# Patient Record
Sex: FEMALE | Race: WHITE | NOT HISPANIC OR LATINO | Employment: OTHER | URBAN - METROPOLITAN AREA
[De-identification: names, ages, dates, MRNs, and addresses within clinical notes are randomized per-mention and may not be internally consistent; named-entity substitution may affect disease eponyms.]

---

## 2017-05-16 ENCOUNTER — LAB (OUTPATIENT)
Dept: LAB | Facility: HOSPITAL | Age: 82
End: 2017-05-16

## 2017-05-16 DIAGNOSIS — D47.2 IGG MONOCLONAL GAMMOPATHY: ICD-10-CM

## 2017-05-16 DIAGNOSIS — Z86.2 H/O: IRON DEFICIENCY ANEMIA: ICD-10-CM

## 2017-05-16 LAB
ALBUMIN SERPL-MCNC: 3.7 G/DL (ref 3.5–5.2)
ALBUMIN/GLOB SERPL: 0.9 G/DL (ref 1.1–2.4)
ALP SERPL-CCNC: 66 U/L (ref 38–116)
ALT SERPL W P-5'-P-CCNC: 12 U/L (ref 0–33)
ANION GAP SERPL CALCULATED.3IONS-SCNC: 11.1 MMOL/L
AST SERPL-CCNC: 19 U/L (ref 0–32)
BASOPHILS # BLD AUTO: 0.01 10*3/MM3 (ref 0–0.1)
BASOPHILS NFR BLD AUTO: 0.2 % (ref 0–1.1)
BILIRUB SERPL-MCNC: 0.4 MG/DL (ref 0.1–1.2)
BUN BLD-MCNC: 19 MG/DL (ref 6–20)
BUN/CREAT SERPL: 26.4 (ref 7.3–30)
CALCIUM SPEC-SCNC: 9.2 MG/DL (ref 8.5–10.2)
CHLORIDE SERPL-SCNC: 100 MMOL/L (ref 98–107)
CO2 SERPL-SCNC: 23.9 MMOL/L (ref 22–29)
CREAT BLD-MCNC: 0.72 MG/DL (ref 0.6–1.1)
DEPRECATED RDW RBC AUTO: 45 FL (ref 37–49)
EOSINOPHIL # BLD AUTO: 0.07 10*3/MM3 (ref 0–0.36)
EOSINOPHIL NFR BLD AUTO: 1.3 % (ref 1–5)
ERYTHROCYTE [DISTWIDTH] IN BLOOD BY AUTOMATED COUNT: 12.4 % (ref 11.7–14.5)
FERRITIN SERPL-MCNC: 65.1 NG/ML
GFR SERPL CREATININE-BSD FRML MDRD: 78 ML/MIN/1.73
GLOBULIN UR ELPH-MCNC: 3.9 GM/DL (ref 1.8–3.5)
GLUCOSE BLD-MCNC: 107 MG/DL (ref 74–124)
HCT VFR BLD AUTO: 36 % (ref 34–45)
HGB BLD-MCNC: 12.1 G/DL (ref 11.5–14.9)
IMM GRANULOCYTES # BLD: 0.01 10*3/MM3 (ref 0–0.03)
IMM GRANULOCYTES NFR BLD: 0.2 % (ref 0–0.5)
IRON 24H UR-MRATE: 102 MCG/DL (ref 37–145)
IRON SATN MFR SERPL: 40 % (ref 14–48)
LYMPHOCYTES # BLD AUTO: 2.15 10*3/MM3 (ref 1–3.5)
LYMPHOCYTES NFR BLD AUTO: 40.2 % (ref 20–49)
MCH RBC QN AUTO: 33.1 PG (ref 27–33)
MCHC RBC AUTO-ENTMCNC: 33.6 G/DL (ref 32–35)
MCV RBC AUTO: 98.4 FL (ref 83–97)
MONOCYTES # BLD AUTO: 0.68 10*3/MM3 (ref 0.25–0.8)
MONOCYTES NFR BLD AUTO: 12.7 % (ref 4–12)
NEUTROPHILS # BLD AUTO: 2.43 10*3/MM3 (ref 1.5–7)
NEUTROPHILS NFR BLD AUTO: 45.4 % (ref 39–75)
NRBC BLD MANUAL-RTO: 0 /100 WBC (ref 0–0)
PLATELET # BLD AUTO: 138 10*3/MM3 (ref 150–375)
PMV BLD AUTO: 9.5 FL (ref 8.9–12.1)
POTASSIUM BLD-SCNC: 4.3 MMOL/L (ref 3.5–4.7)
PROT SERPL-MCNC: 7.6 G/DL (ref 6.3–8)
RBC # BLD AUTO: 3.66 10*6/MM3 (ref 3.9–5)
SODIUM BLD-SCNC: 135 MMOL/L (ref 134–145)
TIBC SERPL-MCNC: 255 MCG/DL (ref 249–505)
TRANSFERRIN SERPL-MCNC: 182 MG/DL (ref 200–360)
WBC NRBC COR # BLD: 5.35 10*3/MM3 (ref 4–10)

## 2017-05-16 PROCEDURE — 85025 COMPLETE CBC W/AUTO DIFF WBC: CPT

## 2017-05-16 PROCEDURE — 84466 ASSAY OF TRANSFERRIN: CPT

## 2017-05-16 PROCEDURE — 36415 COLL VENOUS BLD VENIPUNCTURE: CPT

## 2017-05-16 PROCEDURE — 80053 COMPREHEN METABOLIC PANEL: CPT

## 2017-05-16 PROCEDURE — 83540 ASSAY OF IRON: CPT

## 2017-05-16 PROCEDURE — 82728 ASSAY OF FERRITIN: CPT

## 2017-05-17 LAB
ALBUMIN SERPL-MCNC: 3.5 G/DL (ref 2.9–4.4)
ALBUMIN/GLOB SERPL: 1 {RATIO} (ref 0.7–1.7)
ALPHA1 GLOB FLD ELPH-MCNC: 0.2 G/DL (ref 0–0.4)
ALPHA2 GLOB SERPL ELPH-MCNC: 0.6 G/DL (ref 0.4–1)
B-GLOBULIN SERPL ELPH-MCNC: 0.7 G/DL (ref 0.7–1.3)
GAMMA GLOB SERPL ELPH-MCNC: 2.1 G/DL (ref 0.4–1.8)
GLOBULIN SER CALC-MCNC: 3.6 G/DL (ref 2.2–3.9)
IGA SERPL-MCNC: 66 MG/DL (ref 64–422)
IGG SERPL-MCNC: 2491 MG/DL (ref 700–1600)
IGM SERPL-MCNC: 19 MG/DL (ref 26–217)
INTERPRETATION SERPL IEP-IMP: ABNORMAL
KAPPA LC SERPL-MCNC: 35.58 MG/L (ref 3.3–19.4)
KAPPA LC/LAMBDA SER: 3.5 {RATIO} (ref 0.26–1.65)
LAMBDA LC FREE SERPL-MCNC: 10.18 MG/L (ref 5.71–26.3)
Lab: ABNORMAL
M-SPIKE: 1.9 G/DL
PROT SERPL-MCNC: 7.1 G/DL (ref 6–8.5)

## 2017-05-23 ENCOUNTER — OFFICE VISIT (OUTPATIENT)
Dept: ONCOLOGY | Facility: CLINIC | Age: 82
End: 2017-05-23

## 2017-05-23 ENCOUNTER — APPOINTMENT (OUTPATIENT)
Dept: LAB | Facility: HOSPITAL | Age: 82
End: 2017-05-23

## 2017-05-23 VITALS
RESPIRATION RATE: 16 BRPM | OXYGEN SATURATION: 96 % | HEART RATE: 65 BPM | BODY MASS INDEX: 26.65 KG/M2 | TEMPERATURE: 97.9 F | SYSTOLIC BLOOD PRESSURE: 130 MMHG | HEIGHT: 66 IN | DIASTOLIC BLOOD PRESSURE: 82 MMHG | WEIGHT: 165.8 LBS

## 2017-05-23 DIAGNOSIS — Z86.2 H/O: IRON DEFICIENCY ANEMIA: ICD-10-CM

## 2017-05-23 DIAGNOSIS — D47.2 IGG MONOCLONAL GAMMOPATHY: Primary | ICD-10-CM

## 2017-05-23 PROCEDURE — G0463 HOSPITAL OUTPT CLINIC VISIT: HCPCS | Performed by: INTERNAL MEDICINE

## 2017-05-23 PROCEDURE — 99213 OFFICE O/P EST LOW 20 MIN: CPT | Performed by: INTERNAL MEDICINE

## 2017-05-23 RX ORDER — APIXABAN 5 MG/1
TABLET, FILM COATED ORAL 2 TIMES DAILY
COMMUNITY
Start: 2017-05-03 | End: 2018-07-24

## 2018-04-04 ENCOUNTER — LAB (OUTPATIENT)
Dept: LAB | Facility: HOSPITAL | Age: 83
End: 2018-04-04

## 2018-04-04 DIAGNOSIS — Z86.2 H/O: IRON DEFICIENCY ANEMIA: ICD-10-CM

## 2018-04-04 DIAGNOSIS — D47.2 IGG MONOCLONAL GAMMOPATHY: ICD-10-CM

## 2018-04-04 LAB
ALBUMIN SERPL-MCNC: 3.8 G/DL (ref 3.5–5.2)
ALBUMIN/GLOB SERPL: 0.8 G/DL (ref 1.1–2.4)
ALP SERPL-CCNC: 64 U/L (ref 38–116)
ALT SERPL W P-5'-P-CCNC: 11 U/L (ref 0–33)
ANION GAP SERPL CALCULATED.3IONS-SCNC: 8.3 MMOL/L
AST SERPL-CCNC: 19 U/L (ref 0–32)
BASOPHILS # BLD AUTO: 0.01 10*3/MM3 (ref 0–0.1)
BASOPHILS NFR BLD AUTO: 0.2 % (ref 0–1.1)
BILIRUB SERPL-MCNC: 0.4 MG/DL (ref 0.1–1.2)
BUN BLD-MCNC: 27 MG/DL (ref 6–20)
BUN/CREAT SERPL: 38 (ref 7.3–30)
CALCIUM SPEC-SCNC: 10.1 MG/DL (ref 8.5–10.2)
CHLORIDE SERPL-SCNC: 100 MMOL/L (ref 98–107)
CO2 SERPL-SCNC: 26.7 MMOL/L (ref 22–29)
CREAT BLD-MCNC: 0.71 MG/DL (ref 0.6–1.1)
DEPRECATED RDW RBC AUTO: 45.6 FL (ref 37–49)
EOSINOPHIL # BLD AUTO: 0.06 10*3/MM3 (ref 0–0.36)
EOSINOPHIL NFR BLD AUTO: 1.1 % (ref 1–5)
ERYTHROCYTE [DISTWIDTH] IN BLOOD BY AUTOMATED COUNT: 12.5 % (ref 11.7–14.5)
GFR SERPL CREATININE-BSD FRML MDRD: 79 ML/MIN/1.73
GLOBULIN UR ELPH-MCNC: 4.5 GM/DL (ref 1.8–3.5)
GLUCOSE BLD-MCNC: 112 MG/DL (ref 74–124)
HCT VFR BLD AUTO: 37.4 % (ref 34–45)
HGB BLD-MCNC: 12.5 G/DL (ref 11.5–14.9)
IMM GRANULOCYTES # BLD: 0.02 10*3/MM3 (ref 0–0.03)
IMM GRANULOCYTES NFR BLD: 0.4 % (ref 0–0.5)
LYMPHOCYTES # BLD AUTO: 2.3 10*3/MM3 (ref 1–3.5)
LYMPHOCYTES NFR BLD AUTO: 41 % (ref 20–49)
MCH RBC QN AUTO: 33.4 PG (ref 27–33)
MCHC RBC AUTO-ENTMCNC: 33.4 G/DL (ref 32–35)
MCV RBC AUTO: 100 FL (ref 83–97)
MONOCYTES # BLD AUTO: 0.74 10*3/MM3 (ref 0.25–0.8)
MONOCYTES NFR BLD AUTO: 13.2 % (ref 4–12)
NEUTROPHILS # BLD AUTO: 2.48 10*3/MM3 (ref 1.5–7)
NEUTROPHILS NFR BLD AUTO: 44.1 % (ref 39–75)
NRBC BLD MANUAL-RTO: 0 /100 WBC (ref 0–0)
PLATELET # BLD AUTO: 167 10*3/MM3 (ref 150–375)
PMV BLD AUTO: 9.4 FL (ref 8.9–12.1)
POTASSIUM BLD-SCNC: 4.9 MMOL/L (ref 3.5–4.7)
PROT SERPL-MCNC: 8.3 G/DL (ref 6.3–8)
RBC # BLD AUTO: 3.74 10*6/MM3 (ref 3.9–5)
SODIUM BLD-SCNC: 135 MMOL/L (ref 134–145)
WBC NRBC COR # BLD: 5.61 10*3/MM3 (ref 4–10)

## 2018-04-04 PROCEDURE — 36415 COLL VENOUS BLD VENIPUNCTURE: CPT | Performed by: INTERNAL MEDICINE

## 2018-04-04 PROCEDURE — 85025 COMPLETE CBC W/AUTO DIFF WBC: CPT | Performed by: INTERNAL MEDICINE

## 2018-04-04 PROCEDURE — 80053 COMPREHEN METABOLIC PANEL: CPT

## 2018-04-05 LAB
ALBUMIN SERPL-MCNC: 3.8 G/DL (ref 2.9–4.4)
ALBUMIN/GLOB SERPL: 1 {RATIO} (ref 0.7–1.7)
ALPHA1 GLOB FLD ELPH-MCNC: 0.2 G/DL (ref 0–0.4)
ALPHA2 GLOB SERPL ELPH-MCNC: 0.7 G/DL (ref 0.4–1)
B-GLOBULIN SERPL ELPH-MCNC: 0.7 G/DL (ref 0.7–1.3)
GAMMA GLOB SERPL ELPH-MCNC: 2.5 G/DL (ref 0.4–1.8)
GLOBULIN SER CALC-MCNC: 4.1 G/DL (ref 2.2–3.9)
IGA SERPL-MCNC: 56 MG/DL (ref 64–422)
IGG SERPL-MCNC: 2875 MG/DL (ref 700–1600)
IGM SERPL-MCNC: 17 MG/DL (ref 26–217)
INTERPRETATION SERPL IEP-IMP: ABNORMAL
KAPPA LC SERPL-MCNC: 40.1 MG/L (ref 3.3–19.4)
KAPPA LC/LAMBDA SER: 4.51 {RATIO} (ref 0.26–1.65)
LAMBDA LC FREE SERPL-MCNC: 8.9 MG/L (ref 5.7–26.3)
Lab: ABNORMAL
M-SPIKE: 2.4 G/DL
PROT SERPL-MCNC: 7.9 G/DL (ref 6–8.5)

## 2018-04-11 ENCOUNTER — APPOINTMENT (OUTPATIENT)
Dept: LAB | Facility: HOSPITAL | Age: 83
End: 2018-04-11

## 2018-04-11 ENCOUNTER — OFFICE VISIT (OUTPATIENT)
Dept: ONCOLOGY | Facility: CLINIC | Age: 83
End: 2018-04-11

## 2018-04-11 VITALS
HEIGHT: 66 IN | WEIGHT: 168.6 LBS | TEMPERATURE: 97.7 F | OXYGEN SATURATION: 98 % | SYSTOLIC BLOOD PRESSURE: 104 MMHG | DIASTOLIC BLOOD PRESSURE: 82 MMHG | BODY MASS INDEX: 27.1 KG/M2 | HEART RATE: 66 BPM | RESPIRATION RATE: 18 BRPM

## 2018-04-11 DIAGNOSIS — D47.2 IGG MONOCLONAL GAMMOPATHY: Primary | ICD-10-CM

## 2018-04-11 PROCEDURE — G0463 HOSPITAL OUTPT CLINIC VISIT: HCPCS | Performed by: INTERNAL MEDICINE

## 2018-04-11 PROCEDURE — 99213 OFFICE O/P EST LOW 20 MIN: CPT | Performed by: INTERNAL MEDICINE

## 2018-04-11 RX ORDER — CELECOXIB 100 MG/1
100 CAPSULE ORAL 2 TIMES DAILY
COMMUNITY
End: 2019-10-16

## 2018-04-11 RX ORDER — ACETAMINOPHEN 325 MG/1
325 TABLET, FILM COATED ORAL 3 TIMES DAILY PRN
COMMUNITY
End: 2019-12-05

## 2018-04-11 RX ORDER — LEVOTHYROXINE SODIUM 0.15 MG/1
150 TABLET ORAL DAILY
COMMUNITY
Start: 2018-01-12 | End: 2019-08-26 | Stop reason: SDUPTHER

## 2018-04-11 RX ORDER — DILTIAZEM HYDROCHLORIDE 240 MG/1
CAPSULE, EXTENDED RELEASE ORAL
COMMUNITY
Start: 2018-04-10 | End: 2018-07-24

## 2018-04-11 NOTE — PROGRESS NOTES
Subjective     CHIEF COMPLAINT:      · Monoclonal gammopathy of IgG kappa      HISTORY OF PRESENT ILLNESS:     Isabell Angel is an 83 y.o.  patient with the above medical history.  She returns today for follow-up reporting no new symptoms.  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  No new areas of pain.  No recent infections.    Past Medical History:   Diagnosis Date   • Acid reflux    • Anemia     iron deficiency anemia, resolved   • Aortic aneurysm     MONITORED   • Arthritis    • Colon polyp    • Coronary artery disease    • Disease of thyroid gland    • History of iron deficiency anemia    • Hypercalcemia     Due to increased calcium intake in addition to calcium supplements   • Hypertension    • IgG monoclonal gammopathy     IgG kappa type   • Irregular heart beat     PACEMAKER       Past Surgical History:   Procedure Laterality Date   • ABDOMINAL SURGERY  09/18/2012    Pelvic abscess draining with appendectomy and resection and anastomosis of small intestine   • APPENDECTOMY  09/18/2012   • CHOLECYSTECTOMY  08/31/2012   • COLONOSCOPY N/A 4/13/2016    Procedure: COLONOSCOPY to cecum;  Surgeon: Pro Paredes MD;  Location: [facility] ENDOSCOPY;  Service:    • HYSTERECTOMY     • PACEMAKER IMPLANTATION         Cancer-related family history includes Cancer in her brother.    SCHEDULED MEDS:       Current Outpatient Prescriptions:   •  acetaminophen (TYLENOL) 325 MG tablet, Take 325 mg by mouth 3 (Three) Times a Day., Disp: , Rfl:   •  celecoxib (CeleBREX) 100 MG capsule, Take 100 mg by mouth Daily., Disp: , Rfl:   •  atorvastatin (LIPITOR) 10 MG tablet, Take 10 mg by mouth daily., Disp: , Rfl:   •  cetirizine (ZyrTEC) 10 MG tablet, Take 10 mg by mouth daily., Disp: , Rfl:   •  clobetasol (TEMOVATE) 0.05 % ointment, , Disp: , Rfl:   •  diltiaZEM (TIAZAC) 240 MG 24 hr capsule, , Disp: , Rfl:   •  ELIQUIS 5 MG tablet tablet, 2 (Two) Times a Day., Disp:  ", Rfl:   •  fluticasone (FLONASE) 50 MCG/ACT nasal spray, 2 sprays into each nostril. Administer 2 sprays in each nostril for each dose., Disp: , Rfl:   •  hydrocortisone butyrate 0.1 % cream, , Disp: , Rfl:   •  irbesartan (AVAPRO) 300 MG tablet, , Disp: , Rfl:   •  levothyroxine (SYNTHROID, LEVOTHROID) 150 MCG tablet, , Disp: , Rfl:   •  Metoprolol Succinate (TOPROL XL PO), Take 150 mg by mouth daily., Disp: , Rfl:   •  metoprolol succinate XL (TOPROL-XL) 100 MG 24 hr tablet, , Disp: , Rfl:   •  omeprazole (PriLOSEC) 20 MG capsule, Take 20 mg by mouth daily., Disp: , Rfl:   •  timolol (TIMOPTIC) 0.5 % ophthalmic solution, 1 drop., Disp: , Rfl:     REVIEW OF SYSTEMS:  GENERAL:  No weight changes.  No fatigue.    SKIN:  No skin rashes.  HEME/LYMPH: History of anemia.  RESPIRATORY:  No shortness breath.  CVS: No chest pain.  Lower extremity swelling due to amlodipine.  GI:  No abdominal pain.  MUSCULOSKELETAL:  Right shoulder pain.      Objective   VITAL SIGNS:     Vitals:    04/11/18 1426   BP: 104/82   Pulse: 66   Resp: 18   Temp: 97.7 °F (36.5 °C)   TempSrc: Oral   SpO2: 98%   Weight: 76.5 kg (168 lb 9.6 oz)   Height: 167.6 cm (65.98\")   PainSc: 0-No pain       Wt Readings from Last 3 Encounters:   04/11/18 76.5 kg (168 lb 9.6 oz)   07/07/17 72.6 kg (160 lb)   06/28/17 73 kg (161 lb)       PHYSICAL EXAMINATION:  GENERAL:  Patient appears in good general condition not in acute distress.  SKIN:  No skin rashes.  HEAD:  Normocephalic.  NECK:  Supple with no masses.  LYMPHATICS: No cervical or supraclavicular lymphadenopathy.   ABD: Soft and nontender.  No hepatosplenomegaly.  EXTREMITIES:   trace edema.      RESULT REVIEW:   Results for orders placed or performed in visit on 04/04/18   Comprehensive Metabolic Panel   Result Value Ref Range    Glucose 112 74 - 124 mg/dL    BUN 27 (H) 6 - 20 mg/dL    Creatinine 0.71 0.60 - 1.10 mg/dL    Sodium 135 134 - 145 mmol/L    Potassium 4.9 (H) 3.5 - 4.7 mmol/L    Chloride 100 " 98 - 107 mmol/L    CO2 26.7 22.0 - 29.0 mmol/L    Calcium 10.1 8.5 - 10.2 mg/dL    Total Protein 8.3 (H) 6.3 - 8.0 g/dL    Albumin 3.80 3.50 - 5.20 g/dL    ALT (SGPT) 11 0 - 33 U/L    AST (SGOT) 19 0 - 32 U/L    Alkaline Phosphatase 64 38 - 116 U/L    Total Bilirubin 0.4 0.1 - 1.2 mg/dL    eGFR Non African Amer 79 >60 mL/min/1.73    Globulin 4.5 (H) 1.8 - 3.5 gm/dL    A/G Ratio 0.8 (L) 1.1 - 2.4 g/dL    BUN/Creatinine Ratio 38.0 (H) 7.3 - 30.0    Anion Gap 8.3 mmol/L   JARED and PE,Serum   Result Value Ref Range    IgG 2875 (H) 700 - 1600 mg/dL    IgA 56 (L) 64 - 422 mg/dL    IgM 17 (L) 26 - 217 mg/dL    Total Protein 7.9 6.0 - 8.5 g/dL    Albumin 3.8 2.9 - 4.4 g/dL    Alpha-1-Globulin 0.2 0.0 - 0.4 g/dL    Alpha-2-Globulin 0.7 0.4 - 1.0 g/dL    Beta Globulin 0.7 0.7 - 1.3 g/dL    Gamma Globulin 2.5 (H) 0.4 - 1.8 g/dL    M-William 2.4 (H) Not Observed g/dL    Globulin 4.1 (H) 2.2 - 3.9 g/dL    A/G Ratio 1.0 0.7 - 1.7    Immunofixation Reflex, Serum Comment     Please note Comment     Free Light Chain, Kappa 40.1 (H) 3.3 - 19.4 mg/L    Free Lambda Light Chains 8.9 5.7 - 26.3 mg/L    Kappa/Lambda Ratio 4.51 (H) 0.26 - 1.65   CBC Auto Differential   Result Value Ref Range    WBC 5.61 4.00 - 10.00 10*3/mm3    RBC 3.74 (L) 3.90 - 5.00 10*6/mm3    Hemoglobin 12.5 11.5 - 14.9 g/dL    Hematocrit 37.4 34.0 - 45.0 %    .0 (H) 83.0 - 97.0 fL    MCH 33.4 (H) 27.0 - 33.0 pg    MCHC 33.4 32.0 - 35.0 g/dL    RDW 12.5 11.7 - 14.5 %    RDW-SD 45.6 37.0 - 49.0 fl    MPV 9.4 8.9 - 12.1 fL    Platelets 167 150 - 375 10*3/mm3    Neutrophil % 44.1 39.0 - 75.0 %    Lymphocyte % 41.0 20.0 - 49.0 %    Monocyte % 13.2 (H) 4.0 - 12.0 %    Eosinophil % 1.1 1.0 - 5.0 %    Basophil % 0.2 0.0 - 1.1 %    Immature Grans % 0.4 0.0 - 0.5 %    Neutrophils, Absolute 2.48 1.50 - 7.00 10*3/mm3    Lymphocytes, Absolute 2.30 1.00 - 3.50 10*3/mm3    Monocytes, Absolute 0.74 0.25 - 0.80 10*3/mm3    Eosinophils, Absolute 0.06 0.00 - 0.36 10*3/mm3     Basophils, Absolute 0.01 0.00 - 0.10 10*3/mm3    Immature Grans, Absolute 0.02 0.00 - 0.03 10*3/mm3    nRBC 0.0 0.0 - 0.0 /100 WBC       Assessment/Plan    Monoclonal gammopathy of undetermined significance of IgG kappa type.  M protein is 2.4.  It was 2.2 2016.  She is not anemic.  Kidney function is normal.  No changes to suggest transformation to multiple myeloma.  Her calcium level is at the upper end of normal.    PLAN:    1.  We discussed reducing calcium rich food items.       2.  Repeat CBC, CMP and paraprotein studies in 6 months.  We'll see in follow-up one week after the studies are done.          Hanna Santana MD  04/11/18

## 2018-07-24 ENCOUNTER — HOSPITAL ENCOUNTER (EMERGENCY)
Facility: HOSPITAL | Age: 83
Discharge: HOME | End: 2018-07-24
Attending: EMERGENCY MEDICINE | Admitting: EMERGENCY MEDICINE

## 2018-07-24 VITALS
TEMPERATURE: 97.7 F | HEART RATE: 68 BPM | WEIGHT: 160 LBS | DIASTOLIC BLOOD PRESSURE: 114 MMHG | OXYGEN SATURATION: 97 % | BODY MASS INDEX: 25.71 KG/M2 | RESPIRATION RATE: 18 BRPM | HEIGHT: 66 IN | SYSTOLIC BLOOD PRESSURE: 181 MMHG

## 2018-07-24 DIAGNOSIS — I10 UNCONTROLLED HYPERTENSION: Primary | ICD-10-CM

## 2018-07-24 LAB
ALBUMIN SERPL-MCNC: 4 G/DL (ref 3.5–5.2)
ALBUMIN/GLOB SERPL: 0.9 G/DL
ALP SERPL-CCNC: 60 U/L (ref 39–117)
ALT SERPL W P-5'-P-CCNC: 14 U/L (ref 1–33)
ANION GAP SERPL CALCULATED.3IONS-SCNC: 7 MMOL/L
AST SERPL-CCNC: 19 U/L (ref 1–32)
BASOPHILS # BLD AUTO: 0.01 10*3/MM3 (ref 0–0.2)
BASOPHILS NFR BLD AUTO: 0.2 % (ref 0–1.5)
BILIRUB SERPL-MCNC: 0.5 MG/DL (ref 0.1–1.2)
BUN BLD-MCNC: 22 MG/DL (ref 8–23)
BUN/CREAT SERPL: 31 (ref 7–25)
CALCIUM SPEC-SCNC: 10.4 MG/DL (ref 8.6–10.5)
CHLORIDE SERPL-SCNC: 96 MMOL/L (ref 98–107)
CO2 SERPL-SCNC: 28 MMOL/L (ref 22–29)
CREAT BLD-MCNC: 0.71 MG/DL (ref 0.57–1)
DEPRECATED RDW RBC AUTO: 43.5 FL (ref 37–54)
EOSINOPHIL # BLD AUTO: 0.04 10*3/MM3 (ref 0–0.7)
EOSINOPHIL NFR BLD AUTO: 0.8 % (ref 0.3–6.2)
ERYTHROCYTE [DISTWIDTH] IN BLOOD BY AUTOMATED COUNT: 12.1 % (ref 11.7–13)
GFR SERPL CREATININE-BSD FRML MDRD: 79 ML/MIN/1.73
GLOBULIN UR ELPH-MCNC: 4.4 GM/DL
GLUCOSE BLD-MCNC: 96 MG/DL (ref 65–99)
HCT VFR BLD AUTO: 38.3 % (ref 35.6–45.5)
HGB BLD-MCNC: 13 G/DL (ref 11.9–15.5)
HOLD SPECIMEN: NORMAL
HOLD SPECIMEN: NORMAL
IMM GRANULOCYTES # BLD: 0 10*3/MM3 (ref 0–0.03)
IMM GRANULOCYTES NFR BLD: 0 % (ref 0–0.5)
LYMPHOCYTES # BLD AUTO: 1.8 10*3/MM3 (ref 0.9–4.8)
LYMPHOCYTES NFR BLD AUTO: 37.3 % (ref 19.6–45.3)
MCH RBC QN AUTO: 33.1 PG (ref 26.9–32)
MCHC RBC AUTO-ENTMCNC: 33.9 G/DL (ref 32.4–36.3)
MCV RBC AUTO: 97.5 FL (ref 80.5–98.2)
MONOCYTES # BLD AUTO: 0.67 10*3/MM3 (ref 0.2–1.2)
MONOCYTES NFR BLD AUTO: 13.9 % (ref 5–12)
NEUTROPHILS # BLD AUTO: 2.31 10*3/MM3 (ref 1.9–8.1)
NEUTROPHILS NFR BLD AUTO: 47.8 % (ref 42.7–76)
PLATELET # BLD AUTO: 157 10*3/MM3 (ref 140–500)
PMV BLD AUTO: 9.9 FL (ref 6–12)
POTASSIUM BLD-SCNC: 4.6 MMOL/L (ref 3.5–5.2)
PROT SERPL-MCNC: 8.4 G/DL (ref 6–8.5)
RBC # BLD AUTO: 3.93 10*6/MM3 (ref 3.9–5.2)
SODIUM BLD-SCNC: 131 MMOL/L (ref 136–145)
WBC NRBC COR # BLD: 4.83 10*3/MM3 (ref 4.5–10.7)
WHOLE BLOOD HOLD SPECIMEN: NORMAL
WHOLE BLOOD HOLD SPECIMEN: NORMAL

## 2018-07-24 PROCEDURE — 80053 COMPREHEN METABOLIC PANEL: CPT | Performed by: NURSE PRACTITIONER

## 2018-07-24 PROCEDURE — 99284 EMERGENCY DEPT VISIT MOD MDM: CPT

## 2018-07-24 PROCEDURE — 85025 COMPLETE CBC W/AUTO DIFF WBC: CPT | Performed by: NURSE PRACTITIONER

## 2018-07-24 RX ORDER — DOXAZOSIN MESYLATE 1 MG/1
1 TABLET ORAL DAILY
COMMUNITY
End: 2019-09-23 | Stop reason: DRUGHIGH

## 2018-07-24 RX ORDER — HYDROCHLOROTHIAZIDE 12.5 MG/1
12.5 TABLET ORAL EVERY MORNING
COMMUNITY
End: 2019-08-14

## 2018-09-25 ENCOUNTER — APPOINTMENT (OUTPATIENT)
Dept: LAB | Facility: HOSPITAL | Age: 83
End: 2018-09-25

## 2018-10-02 ENCOUNTER — LAB (OUTPATIENT)
Dept: LAB | Facility: HOSPITAL | Age: 83
End: 2018-10-02

## 2018-10-02 DIAGNOSIS — D47.2 IGG MONOCLONAL GAMMOPATHY: ICD-10-CM

## 2018-10-02 LAB
ALBUMIN SERPL-MCNC: 4 G/DL (ref 3.5–5.2)
ALBUMIN/GLOB SERPL: 1 G/DL (ref 1.1–2.4)
ALP SERPL-CCNC: 61 U/L (ref 38–116)
ALT SERPL W P-5'-P-CCNC: 10 U/L (ref 0–33)
ANION GAP SERPL CALCULATED.3IONS-SCNC: 8.5 MMOL/L
AST SERPL-CCNC: 18 U/L (ref 0–32)
BASOPHILS # BLD AUTO: 0.01 10*3/MM3 (ref 0–0.1)
BASOPHILS NFR BLD AUTO: 0.2 % (ref 0–1.1)
BILIRUB SERPL-MCNC: 0.5 MG/DL (ref 0.1–1.2)
BUN BLD-MCNC: 23 MG/DL (ref 6–20)
BUN/CREAT SERPL: 30.7 (ref 7.3–30)
CALCIUM SPEC-SCNC: 9.7 MG/DL (ref 8.5–10.2)
CHLORIDE SERPL-SCNC: 92 MMOL/L (ref 98–107)
CO2 SERPL-SCNC: 28.5 MMOL/L (ref 22–29)
CREAT BLD-MCNC: 0.75 MG/DL (ref 0.6–1.1)
DEPRECATED RDW RBC AUTO: 44.4 FL (ref 37–49)
EOSINOPHIL # BLD AUTO: 0.05 10*3/MM3 (ref 0–0.36)
EOSINOPHIL NFR BLD AUTO: 0.9 % (ref 1–5)
ERYTHROCYTE [DISTWIDTH] IN BLOOD BY AUTOMATED COUNT: 12.3 % (ref 11.7–14.5)
GFR SERPL CREATININE-BSD FRML MDRD: 74 ML/MIN/1.73
GLOBULIN UR ELPH-MCNC: 3.9 GM/DL (ref 1.8–3.5)
GLUCOSE BLD-MCNC: 96 MG/DL (ref 74–124)
HCT VFR BLD AUTO: 33.7 % (ref 34–45)
HGB BLD-MCNC: 11.4 G/DL (ref 11.5–14.9)
IMM GRANULOCYTES # BLD: 0.01 10*3/MM3 (ref 0–0.03)
IMM GRANULOCYTES NFR BLD: 0.2 % (ref 0–0.5)
LYMPHOCYTES # BLD AUTO: 2.06 10*3/MM3 (ref 1–3.5)
LYMPHOCYTES NFR BLD AUTO: 36.7 % (ref 20–49)
MCH RBC QN AUTO: 33.1 PG (ref 27–33)
MCHC RBC AUTO-ENTMCNC: 33.8 G/DL (ref 32–35)
MCV RBC AUTO: 98 FL (ref 83–97)
MONOCYTES # BLD AUTO: 0.89 10*3/MM3 (ref 0.25–0.8)
MONOCYTES NFR BLD AUTO: 15.9 % (ref 4–12)
NEUTROPHILS # BLD AUTO: 2.59 10*3/MM3 (ref 1.5–7)
NEUTROPHILS NFR BLD AUTO: 46.1 % (ref 39–75)
NRBC BLD MANUAL-RTO: 0 /100 WBC (ref 0–0)
PLATELET # BLD AUTO: 142 10*3/MM3 (ref 150–375)
PMV BLD AUTO: 9.3 FL (ref 8.9–12.1)
POTASSIUM BLD-SCNC: 4.8 MMOL/L (ref 3.5–4.7)
PROT SERPL-MCNC: 7.9 G/DL (ref 6.3–8)
RBC # BLD AUTO: 3.44 10*6/MM3 (ref 3.9–5)
SODIUM BLD-SCNC: 129 MMOL/L (ref 134–145)
WBC NRBC COR # BLD: 5.61 10*3/MM3 (ref 4–10)

## 2018-10-02 PROCEDURE — 36415 COLL VENOUS BLD VENIPUNCTURE: CPT | Performed by: INTERNAL MEDICINE

## 2018-10-02 PROCEDURE — 80053 COMPREHEN METABOLIC PANEL: CPT | Performed by: INTERNAL MEDICINE

## 2018-10-02 PROCEDURE — 85025 COMPLETE CBC W/AUTO DIFF WBC: CPT | Performed by: INTERNAL MEDICINE

## 2018-10-03 LAB
ALBUMIN SERPL-MCNC: 3.9 G/DL (ref 2.9–4.4)
ALBUMIN/GLOB SERPL: 1 {RATIO} (ref 0.7–1.7)
ALPHA1 GLOB FLD ELPH-MCNC: 0.2 G/DL (ref 0–0.4)
ALPHA2 GLOB SERPL ELPH-MCNC: 0.6 G/DL (ref 0.4–1)
B-GLOBULIN SERPL ELPH-MCNC: 0.7 G/DL (ref 0.7–1.3)
GAMMA GLOB SERPL ELPH-MCNC: 2.6 G/DL (ref 0.4–1.8)
GLOBULIN SER CALC-MCNC: 4 G/DL (ref 2.2–3.9)
IGA SERPL-MCNC: 50 MG/DL (ref 64–422)
IGG SERPL-MCNC: 3151 MG/DL (ref 700–1600)
IGM SERPL-MCNC: 14 MG/DL (ref 26–217)
INTERPRETATION SERPL IEP-IMP: ABNORMAL
KAPPA LC SERPL-MCNC: 36.2 MG/L (ref 3.3–19.4)
KAPPA LC/LAMBDA SER: 4.11 {RATIO} (ref 0.26–1.65)
LAMBDA LC FREE SERPL-MCNC: 8.8 MG/L (ref 5.7–26.3)
Lab: ABNORMAL
M-SPIKE: 2.4 G/DL
PROT SERPL-MCNC: 7.9 G/DL (ref 6–8.5)

## 2018-10-09 ENCOUNTER — TELEPHONE (OUTPATIENT)
Dept: ONCOLOGY | Facility: HOSPITAL | Age: 83
End: 2018-10-09

## 2018-10-09 ENCOUNTER — APPOINTMENT (OUTPATIENT)
Dept: LAB | Facility: HOSPITAL | Age: 83
End: 2018-10-09

## 2018-10-09 ENCOUNTER — OFFICE VISIT (OUTPATIENT)
Dept: ONCOLOGY | Facility: CLINIC | Age: 83
End: 2018-10-09

## 2018-10-09 VITALS
DIASTOLIC BLOOD PRESSURE: 82 MMHG | HEIGHT: 66 IN | OXYGEN SATURATION: 96 % | TEMPERATURE: 97.6 F | WEIGHT: 165.2 LBS | RESPIRATION RATE: 16 BRPM | BODY MASS INDEX: 26.55 KG/M2 | HEART RATE: 85 BPM | SYSTOLIC BLOOD PRESSURE: 130 MMHG

## 2018-10-09 DIAGNOSIS — E87.1 HYPONATREMIA: ICD-10-CM

## 2018-10-09 DIAGNOSIS — Z86.2 H/O: IRON DEFICIENCY ANEMIA: ICD-10-CM

## 2018-10-09 DIAGNOSIS — D47.2 IGG MONOCLONAL GAMMOPATHY: Primary | ICD-10-CM

## 2018-10-09 LAB
ANION GAP SERPL CALCULATED.3IONS-SCNC: 7 MMOL/L
BASOPHILS # BLD AUTO: 0 10*3/MM3 (ref 0–0.1)
BASOPHILS NFR BLD AUTO: 0 % (ref 0–1.1)
BUN BLD-MCNC: 20 MG/DL (ref 6–20)
BUN/CREAT SERPL: 24.7 (ref 7.3–30)
CALCIUM SPEC-SCNC: 9.7 MG/DL (ref 8.5–10.2)
CHLORIDE SERPL-SCNC: 93 MMOL/L (ref 98–107)
CO2 SERPL-SCNC: 28 MMOL/L (ref 22–29)
CREAT BLD-MCNC: 0.81 MG/DL (ref 0.6–1.1)
DEPRECATED RDW RBC AUTO: 43.8 FL (ref 37–49)
EOSINOPHIL # BLD AUTO: 0.05 10*3/MM3 (ref 0–0.36)
EOSINOPHIL NFR BLD AUTO: 1.1 % (ref 1–5)
ERYTHROCYTE [DISTWIDTH] IN BLOOD BY AUTOMATED COUNT: 12.3 % (ref 11.7–14.5)
FERRITIN SERPL-MCNC: 79.5 NG/ML
GFR SERPL CREATININE-BSD FRML MDRD: 68 ML/MIN/1.73
GLUCOSE BLD-MCNC: 101 MG/DL (ref 74–124)
HCT VFR BLD AUTO: 33.8 % (ref 34–45)
HGB BLD-MCNC: 11.5 G/DL (ref 11.5–14.9)
IMM GRANULOCYTES # BLD: 0.01 10*3/MM3 (ref 0–0.03)
IMM GRANULOCYTES NFR BLD: 0.2 % (ref 0–0.5)
IRON 24H UR-MRATE: 68 MCG/DL (ref 37–145)
IRON SATN MFR SERPL: 23 % (ref 14–48)
LYMPHOCYTES # BLD AUTO: 1.67 10*3/MM3 (ref 1–3.5)
LYMPHOCYTES NFR BLD AUTO: 38 % (ref 20–49)
MCH RBC QN AUTO: 33.2 PG (ref 27–33)
MCHC RBC AUTO-ENTMCNC: 34 G/DL (ref 32–35)
MCV RBC AUTO: 97.7 FL (ref 83–97)
MONOCYTES # BLD AUTO: 0.66 10*3/MM3 (ref 0.25–0.8)
MONOCYTES NFR BLD AUTO: 15 % (ref 4–12)
NEUTROPHILS # BLD AUTO: 2.01 10*3/MM3 (ref 1.5–7)
NEUTROPHILS NFR BLD AUTO: 45.7 % (ref 39–75)
NRBC BLD MANUAL-RTO: 0 /100 WBC (ref 0–0)
PLATELET # BLD AUTO: 143 10*3/MM3 (ref 150–375)
PMV BLD AUTO: 9 FL (ref 8.9–12.1)
POTASSIUM BLD-SCNC: 5 MMOL/L (ref 3.5–4.7)
RBC # BLD AUTO: 3.46 10*6/MM3 (ref 3.9–5)
SODIUM BLD-SCNC: 128 MMOL/L (ref 134–145)
TIBC SERPL-MCNC: 291 MCG/DL (ref 249–505)
TRANSFERRIN SERPL-MCNC: 208 MG/DL (ref 200–360)
WBC NRBC COR # BLD: 4.4 10*3/MM3 (ref 4–10)

## 2018-10-09 PROCEDURE — 85025 COMPLETE CBC W/AUTO DIFF WBC: CPT | Performed by: INTERNAL MEDICINE

## 2018-10-09 PROCEDURE — 80048 BASIC METABOLIC PNL TOTAL CA: CPT | Performed by: INTERNAL MEDICINE

## 2018-10-09 PROCEDURE — 83540 ASSAY OF IRON: CPT | Performed by: INTERNAL MEDICINE

## 2018-10-09 PROCEDURE — 82728 ASSAY OF FERRITIN: CPT | Performed by: INTERNAL MEDICINE

## 2018-10-09 PROCEDURE — 84466 ASSAY OF TRANSFERRIN: CPT | Performed by: INTERNAL MEDICINE

## 2018-10-09 PROCEDURE — 99214 OFFICE O/P EST MOD 30 MIN: CPT | Performed by: INTERNAL MEDICINE

## 2018-10-09 PROCEDURE — 36415 COLL VENOUS BLD VENIPUNCTURE: CPT | Performed by: INTERNAL MEDICINE

## 2018-10-09 NOTE — TELEPHONE ENCOUNTER
Pt called , no answer, left message on voicemail and faxed  Lab results to Pt PCP Dr Meredith Kehrer

## 2018-10-09 NOTE — TELEPHONE ENCOUNTER
----- Message from Hanna Santana MD sent at 10/9/2018  4:22 PM EDT -----  Please inform the patient that sodium has decreased to 128.  Please forward the result to the patient's primary care physician.  Iron levels are not low.    Thank you

## 2018-10-09 NOTE — PROGRESS NOTES
Subjective     CHIEF COMPLAINT:      Chief Complaint   Patient presents with   • Follow-up     no concerns         HISTORY OF PRESENT ILLNESS:     Isabell Angel is a 83 y.o. female patient who returns today for follow up on monoclonal gammopathy.  She had a fall last week and landed her left side of the face and developed a bruise.  The bruise is gradually improving.  She reports that she tripped over a wire and was not dizzy before the fall.        Past Medical History:   Diagnosis Date   • Acid reflux    • Anemia     iron deficiency anemia, resolved   • Aortic aneurysm (CMS/HCC)     MONITORED   • Arthritis    • Atrial fibrillation (CMS/HCC)    • Colon polyp    • Coronary artery disease    • Disease of thyroid gland    • Glaucoma    • Glenohumeral arthritis, right    • History of iron deficiency anemia    • Hypercalcemia     Due to increased calcium intake in addition to calcium supplements   • Hypercholesterolemia    • Hypertension    • IgG monoclonal gammopathy     IgG kappa type   • Irregular heart beat     PACEMAKER       Past Surgical History:   Procedure Laterality Date   • ABDOMINAL SURGERY  09/18/2012    Pelvic abscess draining with appendectomy and resection and anastomosis of small intestine   • APPENDECTOMY  09/18/2012   • CHOLECYSTECTOMY  08/31/2012   • COLONOSCOPY N/A 4/13/2016    Procedure: COLONOSCOPY to cecum;  Surgeon: rPo Paredes MD;  Location: [facility] ENDOSCOPY;  Service:    • HYSTERECTOMY  1984   • PACEMAKER IMPLANTATION         Cancer-related family history includes Cancer in her brother.  Social History   Substance Use Topics   • Smoking status: Never Smoker   • Smokeless tobacco: Never Used   • Alcohol use No       MEDICATIONS:    Current Outpatient Prescriptions:   •  acetaminophen (TYLENOL) 325 MG tablet, Take 325 mg by mouth 3 (Three) Times a Day As Needed., Disp: , Rfl:   •  apixaban (ELIQUIS) 5 MG tablet tablet, Take 5 mg by mouth 2 (Two) Times a Day., Disp: , Rfl:   •   atorvastatin (LIPITOR) 10 MG tablet, Take 10 mg by mouth daily., Disp: , Rfl:   •  celecoxib (CeleBREX) 100 MG capsule, Take 100 mg by mouth 2 (Two) Times a Day. Takes 1/2 tablet twice a day, Disp: , Rfl:   •  cetirizine (ZyrTEC) 10 MG tablet, Take 10 mg by mouth Daily As Needed., Disp: , Rfl:   •  doxazosin (CARDURA) 1 MG tablet, Take 1 mg by mouth Daily., Disp: , Rfl:   •  fluticasone (FLONASE) 50 MCG/ACT nasal spray, 2 sprays into the nostril(s) as directed by provider Daily. Administer 2 sprays in each nostril for each dose. , Disp: , Rfl:   •  hydrochlorothiazide (HYDRODIURIL) 12.5 MG tablet, Take 12.5 mg by mouth Every Morning., Disp: , Rfl:   •  irbesartan (AVAPRO) 300 MG tablet, Take 300 mg by mouth Daily., Disp: , Rfl:   •  levothyroxine (SYNTHROID, LEVOTHROID) 150 MCG tablet, Take 150 mcg by mouth Daily., Disp: , Rfl:   •  metoprolol succinate XL (TOPROL-XL) 100 MG 24 hr tablet, Take 150 mg by mouth Daily., Disp: , Rfl:   •  omeprazole (PriLOSEC) 20 MG capsule, Take 20 mg by mouth 2 (Two) Times a Day., Disp: , Rfl:   •  timolol (TIMOPTIC) 0.5 % ophthalmic solution, Administer 1 drop to both eyes Daily., Disp: , Rfl:     ALLERGIES:  Allergies   Allergen Reactions   • Penicillin V Potassium Swelling     Throat swelled   • Latex Itching       REVIEW OF SYSTEMS:  Review of Systems   Constitutional: Negative for chills, fever and unexpected weight change.   HENT: Negative for mouth sores, nosebleeds, sore throat and voice change.    Eyes: Negative for visual disturbance.   Respiratory: Positive for cough. Negative for shortness of breath.    Cardiovascular: Negative for chest pain and leg swelling.   Gastrointestinal: Negative for abdominal pain, blood in stool, constipation, diarrhea, nausea and vomiting.   Genitourinary: Negative for dysuria, frequency and hematuria.   Musculoskeletal: Negative for arthralgias, back pain and joint swelling.   Skin: Negative for rash.   Neurological: Negative for dizziness,  "numbness and headaches.   Hematological: Negative for adenopathy. Does not bruise/bleed easily.   Psychiatric/Behavioral: Negative for dysphoric mood. The patient is not nervous/anxious.          Objective   VITAL SIGNS:     Vitals:    10/09/18 1434   BP: 130/82   Pulse: 85   Resp: 16   Temp: 97.6 °F (36.4 °C)   TempSrc: Oral   SpO2: 96%   Weight: 74.9 kg (165 lb 3.2 oz)   Height: 167.6 cm (65.98\")   PainSc: 0-No pain     Body mass index is 26.68 kg/m².     Wt Readings from Last 3 Encounters:   10/09/18 74.9 kg (165 lb 3.2 oz)   07/24/18 72.6 kg (160 lb)   04/11/18 76.5 kg (168 lb 9.6 oz)       PHYSICAL EXAMINATION:  GENERAL:  The patient appears in good general condition, not in acute distress.  SKIN: Warm and dry.  Ecchymosis over the left side of the face.  HEAD:  Normocephalic.  EYES:  No Jaundice. No Pallor. Pupils equal. EOMI.  NECK:  Supple with Good ROM. No Thyromegaly. No Masses.  LYMPHATICS:  No cervical or supraclavicular lymphadenopathy.  CHEST: Normal respiratory effort. Lungs clear to auscultation.   CARDIAC:  Normal S1 & S2. No murmurs.  ABDOMEN:  Soft. No tenderness. No Hepatomegaly. No Splenomegaly. No masses.  EXTREMITIES:  No arthritic changes. No Edema. No Calf tenderness.  NEUROLOGICAL:  No Focal neurological deficits.      DIAGNOSTIC DATA:     Component      Latest Ref Rng & Units 4/4/2018 7/24/2018 10/2/2018   WBC      4.00 - 10.00 10*3/mm3 5.61 4.83 5.61   RBC      3.90 - 5.00 10*6/mm3 3.74 (L) 3.93 3.44 (L)   Hemoglobin      11.5 - 14.9 g/dL 12.5 13.0 11.4 (L)   Hematocrit      34.0 - 45.0 % 37.4 38.3 33.7 (L)   MCV      83.0 - 97.0 fL 100.0 (H) 97.5 98.0 (H)   MCH      27.0 - 33.0 pg 33.4 (H) 33.1 (H) 33.1 (H)   MCHC      32.0 - 35.0 g/dL 33.4 33.9 33.8   RDW      11.7 - 14.5 % 12.5 12.1 12.3   RDW-SD      37.0 - 49.0 fl 45.6 43.5 44.4   MPV      8.9 - 12.1 fL 9.4 9.9 9.3   Platelets      150 - 375 10*3/mm3 167 157 142 (L)   Neutrophil %      39.0 - 75.0 % 44.1 47.8 46.1   Lymphocyte %      " 20.0 - 49.0 % 41.0 37.3 36.7   Monocyte %      4.0 - 12.0 % 13.2 (H) 13.9 (H) 15.9 (H)   Eosinophil %      1.0 - 5.0 % 1.1 0.8 0.9 (L)   Basophil %      0.0 - 1.1 % 0.2 0.2 0.2   Immature Grans %      0.0 - 0.5 % 0.4 0.0 0.2   Neutrophils, Absolute      1.50 - 7.00 10*3/mm3 2.48 2.31 2.59   Lymphocytes, Absolute      1.00 - 3.50 10*3/mm3 2.30 1.80 2.06   Monocytes, Absolute      0.25 - 0.80 10*3/mm3 0.74 0.67 0.89 (H)   Eosinophils, Absolute      0.00 - 0.36 10*3/mm3 0.06 0.04 0.05   Basophils, Absolute      0.00 - 0.10 10*3/mm3 0.01 0.01 0.01   Immature Grans, Absolute      0.00 - 0.03 10*3/mm3 0.02 0.00 0.01   nRBC      0.0 - 0.0 /100 WBC 0.0  0.0         Component      Latest Ref Rng & Units 11/30/2016 5/16/2017 4/4/2018 10/2/2018   IgG      700 - 1600 mg/dL 2715 (H) 2491 (H) 2875 (H) 3151 (H)   IgA      64 - 422 mg/dL 70 66 56 (L) 50 (L)   IgM      26 - 217 mg/dL 22 (L) 19 (L) 17 (L) 14 (L)   Total Protein      6.0 - 8.5 g/dL 7.6 7.1 7.9 7.9   Albumin      2.9 - 4.4 g/dL 3.8 3.5 3.8 3.9   Alpha-1-Globulin      0.0 - 0.4 g/dL 0.2 0.2 0.2 0.2   Alpha-2-Globulin      0.4 - 1.0 g/dL 0.6 0.6 0.7 0.6   Beta Globulin      0.7 - 1.3 g/dL 0.8 0.7 0.7 0.7   Gamma Globulin      0.4 - 1.8 g/dL 2.2 (H) 2.1 (H) 2.5 (H) 2.6 (H)   M-William      Not Observed g/dL 2.1 (H) 1.9 (H) 2.4 (H) 2.4 (H)   Globulin      2.2 - 3.9 g/dL 3.8 3.6 4.1 (H) 4.0 (H)   A/G Ratio      0.7 - 1.7 1.1 1.0 1.0 1.0   Immunofixation Reflex, Serum       Comment Comment Comment Comment   Please Note       Comment Comment Comment Comment   Free Light Chain, Kappa      3.3 - 19.4 mg/L 39.05 (H) 35.58 (H) 40.1 (H) 36.2 (H)   Free Lambda Light Chains      5.7 - 26.3 mg/L 9.87 10.18 8.9 8.8   Kappa/Lambda Ratio      0.26 - 1.65 3.96 (H) 3.50 (H) 4.51 (H) 4.11 (H)         Assessment/Plan   1.  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  M protein is stable at 2.4 g with no change since April 2018 and with a slight increase since 2017.   She is mildly anemic.  Kidney function remains normal.  There is no evidence of transformation to multiple myeloma.  I reassured the patient about the result.    2.  Anemia.  She has history of iron deficiency anemia.    3.  Hyponatremia.  This might be attributed to her diuretic or other antihypertensive medicines.  Patient states that she eats a low salt diet to her hypertension which was recently uncontrolled.      PLAN:    1.  Repeat CBC today.  I will also obtain ferritin and iron panel.    2.  Repeat BMP today.  We'll contact her with the result and we will forward it to the primary care physician.    3.  I'll see in follow-up in 6 months with CBC CMP and ferritin and iron panel SPEP and JARED one week before her return visit.        Hanna Santana MD  10/09/18

## 2019-03-19 ENCOUNTER — LAB (OUTPATIENT)
Dept: LAB | Facility: HOSPITAL | Age: 84
End: 2019-03-19

## 2019-03-19 DIAGNOSIS — Z86.2 H/O: IRON DEFICIENCY ANEMIA: ICD-10-CM

## 2019-03-19 DIAGNOSIS — E87.1 HYPONATREMIA: ICD-10-CM

## 2019-03-19 DIAGNOSIS — D47.2 IGG MONOCLONAL GAMMOPATHY: ICD-10-CM

## 2019-03-19 LAB
ALBUMIN SERPL-MCNC: 3.8 G/DL (ref 3.5–5.2)
ALBUMIN/GLOB SERPL: 0.8 G/DL (ref 1.1–2.4)
ALP SERPL-CCNC: 57 U/L (ref 38–116)
ALT SERPL W P-5'-P-CCNC: 9 U/L (ref 0–33)
ANION GAP SERPL CALCULATED.3IONS-SCNC: 9.6 MMOL/L
AST SERPL-CCNC: 17 U/L (ref 0–32)
BASOPHILS # BLD AUTO: 0.01 10*3/MM3 (ref 0–0.2)
BASOPHILS NFR BLD AUTO: 0.2 % (ref 0–1.5)
BILIRUB SERPL-MCNC: 0.5 MG/DL (ref 0.2–1.2)
BUN BLD-MCNC: 24 MG/DL (ref 6–20)
BUN/CREAT SERPL: 30 (ref 7.3–30)
CALCIUM SPEC-SCNC: 10 MG/DL (ref 8.5–10.2)
CHLORIDE SERPL-SCNC: 94 MMOL/L (ref 98–107)
CO2 SERPL-SCNC: 25.4 MMOL/L (ref 22–29)
CREAT BLD-MCNC: 0.8 MG/DL (ref 0.6–1.1)
DEPRECATED RDW RBC AUTO: 44 FL (ref 37–54)
EOSINOPHIL # BLD AUTO: 0.06 10*3/MM3 (ref 0–0.4)
EOSINOPHIL NFR BLD AUTO: 1 % (ref 0.3–6.2)
ERYTHROCYTE [DISTWIDTH] IN BLOOD BY AUTOMATED COUNT: 12.2 % (ref 12.3–15.4)
FERRITIN SERPL-MCNC: 100.3 NG/ML (ref 13–150)
GFR SERPL CREATININE-BSD FRML MDRD: 68 ML/MIN/1.73
GLOBULIN UR ELPH-MCNC: 4.6 GM/DL (ref 1.8–3.5)
GLUCOSE BLD-MCNC: 92 MG/DL (ref 74–124)
HCT VFR BLD AUTO: 34.7 % (ref 34–46.6)
HGB BLD-MCNC: 11.6 G/DL (ref 12–15.9)
IMM GRANULOCYTES # BLD AUTO: 0.01 10*3/MM3 (ref 0–0.05)
IMM GRANULOCYTES NFR BLD AUTO: 0.2 % (ref 0–0.5)
IRON 24H UR-MRATE: 84 MCG/DL (ref 37–145)
IRON SATN MFR SERPL: 33 % (ref 14–48)
LYMPHOCYTES # BLD AUTO: 2.19 10*3/MM3 (ref 0.7–3.1)
LYMPHOCYTES NFR BLD AUTO: 37.2 % (ref 19.6–45.3)
MCH RBC QN AUTO: 32.9 PG (ref 26.6–33)
MCHC RBC AUTO-ENTMCNC: 33.4 G/DL (ref 31.5–35.7)
MCV RBC AUTO: 98.3 FL (ref 79–97)
MONOCYTES # BLD AUTO: 0.72 10*3/MM3 (ref 0.1–0.9)
MONOCYTES NFR BLD AUTO: 12.2 % (ref 5–12)
NEUTROPHILS # BLD AUTO: 2.9 10*3/MM3 (ref 1.4–7)
NEUTROPHILS NFR BLD AUTO: 49.2 % (ref 42.7–76)
NRBC BLD AUTO-RTO: 0 /100 WBC (ref 0–0)
PLATELET # BLD AUTO: 121 10*3/MM3 (ref 140–450)
PMV BLD AUTO: 9.4 FL (ref 6–12)
POTASSIUM BLD-SCNC: 4.9 MMOL/L (ref 3.5–4.7)
PROT SERPL-MCNC: 8.4 G/DL (ref 6.3–8)
RBC # BLD AUTO: 3.53 10*6/MM3 (ref 3.77–5.28)
SODIUM BLD-SCNC: 129 MMOL/L (ref 134–145)
TIBC SERPL-MCNC: 252 MCG/DL (ref 249–505)
TRANSFERRIN SERPL-MCNC: 180 MG/DL (ref 200–360)
WBC NRBC COR # BLD: 5.89 10*3/MM3 (ref 3.4–10.8)

## 2019-03-19 PROCEDURE — 83540 ASSAY OF IRON: CPT

## 2019-03-19 PROCEDURE — 80053 COMPREHEN METABOLIC PANEL: CPT

## 2019-03-19 PROCEDURE — 85025 COMPLETE CBC W/AUTO DIFF WBC: CPT

## 2019-03-19 PROCEDURE — 36415 COLL VENOUS BLD VENIPUNCTURE: CPT

## 2019-03-19 PROCEDURE — 82728 ASSAY OF FERRITIN: CPT

## 2019-03-19 PROCEDURE — 84466 ASSAY OF TRANSFERRIN: CPT

## 2019-03-20 LAB
ALBUMIN SERPL-MCNC: 3.6 G/DL (ref 2.9–4.4)
ALBUMIN/GLOB SERPL: 0.9 {RATIO} (ref 0.7–1.7)
ALPHA1 GLOB FLD ELPH-MCNC: 0.2 G/DL (ref 0–0.4)
ALPHA2 GLOB SERPL ELPH-MCNC: 0.7 G/DL (ref 0.4–1)
B-GLOBULIN SERPL ELPH-MCNC: 0.7 G/DL (ref 0.7–1.3)
GAMMA GLOB SERPL ELPH-MCNC: 2.8 G/DL (ref 0.4–1.8)
GLOBULIN SER CALC-MCNC: 4.5 G/DL (ref 2.2–3.9)
IGA SERPL-MCNC: 39 MG/DL (ref 64–422)
IGG SERPL-MCNC: 3284 MG/DL (ref 700–1600)
IGM SERPL-MCNC: 14 MG/DL (ref 26–217)
INTERPRETATION SERPL IEP-IMP: ABNORMAL
KAPPA LC SERPL-MCNC: 43.3 MG/L (ref 3.3–19.4)
KAPPA LC/LAMBDA SER: 5.77 {RATIO} (ref 0.26–1.65)
LAMBDA LC FREE SERPL-MCNC: 7.5 MG/L (ref 5.7–26.3)
Lab: ABNORMAL
M-SPIKE: 2.7 G/DL
PROT SERPL-MCNC: 8.1 G/DL (ref 6–8.5)

## 2019-03-26 ENCOUNTER — OFFICE VISIT (OUTPATIENT)
Dept: ONCOLOGY | Facility: CLINIC | Age: 84
End: 2019-03-26

## 2019-03-26 ENCOUNTER — APPOINTMENT (OUTPATIENT)
Dept: LAB | Facility: HOSPITAL | Age: 84
End: 2019-03-26

## 2019-03-26 VITALS
OXYGEN SATURATION: 100 % | BODY MASS INDEX: 26.1 KG/M2 | TEMPERATURE: 97.5 F | DIASTOLIC BLOOD PRESSURE: 90 MMHG | RESPIRATION RATE: 16 BRPM | HEART RATE: 84 BPM | WEIGHT: 162.4 LBS | HEIGHT: 66 IN | SYSTOLIC BLOOD PRESSURE: 140 MMHG

## 2019-03-26 DIAGNOSIS — E53.8 B12 DEFICIENCY: ICD-10-CM

## 2019-03-26 DIAGNOSIS — D69.6 THROMBOCYTOPENIA (HCC): ICD-10-CM

## 2019-03-26 DIAGNOSIS — D47.2 IGG MONOCLONAL GAMMOPATHY: Primary | ICD-10-CM

## 2019-03-26 DIAGNOSIS — Z86.2 H/O: IRON DEFICIENCY ANEMIA: ICD-10-CM

## 2019-03-26 PROCEDURE — G0463 HOSPITAL OUTPT CLINIC VISIT: HCPCS | Performed by: INTERNAL MEDICINE

## 2019-03-26 PROCEDURE — 99214 OFFICE O/P EST MOD 30 MIN: CPT | Performed by: INTERNAL MEDICINE

## 2019-03-26 RX ORDER — FAMOTIDINE 40 MG/1
TABLET, FILM COATED ORAL
COMMUNITY
Start: 2019-03-15 | End: 2019-11-30 | Stop reason: SDUPTHER

## 2019-03-26 RX ORDER — LANOLIN ALCOHOL/MO/W.PET/CERES
1000 CREAM (GRAM) TOPICAL DAILY
Start: 2019-03-26

## 2019-03-26 RX ORDER — TIMOLOL MALEATE 5 MG/ML
SOLUTION OPHTHALMIC
COMMUNITY
Start: 2019-01-24 | End: 2019-12-05

## 2019-03-26 NOTE — PROGRESS NOTES
Subjective     CHIEF COMPLAINT:      Chief Complaint   Patient presents with   • Follow-up     no concern       HISTORY OF PRESENT ILLNESS:     Isabell Angel is a 84 y.o. female patient who returns today for follow up on her monoclonal gammopathy.  She continues that this is related to her arthritis affecting her hands and bilateral shoulder joints.  She is not complaining of back pain.  Recent labs done by her PCP, she was found to have a low normal vitamin B12 level.  She was given a B12 injection by her PCP.    Patient has hyponatremia attributed to her diuretics.  She saw her PCP but no changes were made to her medicines.        REVIEW OF SYSTEMS:  Review of Systems   Constitutional: Negative for chills, fever and unexpected weight change.   HENT: Negative for mouth sores, nosebleeds, sore throat and voice change.    Eyes: Negative for visual disturbance.   Respiratory: Negative for cough and shortness of breath.    Cardiovascular: Negative for chest pain and leg swelling.   Gastrointestinal: Negative for abdominal pain, blood in stool, constipation, diarrhea, nausea and vomiting.   Genitourinary: Negative for dysuria, frequency and hematuria.   Musculoskeletal: Negative for arthralgias, back pain and joint swelling.   Skin: Negative for rash.   Neurological: Negative for dizziness, numbness and headaches.   Hematological: Negative for adenopathy. Does not bruise/bleed easily.   Psychiatric/Behavioral: Negative for dysphoric mood. The patient is not nervous/anxious.      I verified the ROS obtained by the MA.      Past Medical History:   Diagnosis Date   • Acid reflux    • Anemia     iron deficiency anemia, resolved   • Aortic aneurysm (CMS/HCC)     MONITORED   • Arthritis    • Atrial fibrillation (CMS/HCC)    • Colon polyp    • Coronary artery disease    • Disease of thyroid gland    • Glaucoma    • Glenohumeral arthritis, right    • History of iron deficiency anemia    • Hypercalcemia     Due to increased  calcium intake in addition to calcium supplements   • Hypercholesterolemia    • Hypertension    • IgG monoclonal gammopathy     IgG kappa type   • Irregular heart beat     PACEMAKER       Past Surgical History:   Procedure Laterality Date   • ABDOMINAL SURGERY  09/18/2012    Pelvic abscess draining with appendectomy and resection and anastomosis of small intestine   • APPENDECTOMY  09/18/2012   • CHOLECYSTECTOMY  08/31/2012   • COLONOSCOPY N/A 4/13/2016    Procedure: COLONOSCOPY to cecum;  Surgeon: Pro Paredes MD;  Location: [facility] ENDOSCOPY;  Service:    • HYSTERECTOMY  1984   • PACEMAKER IMPLANTATION         Cancer-related family history includes Cancer in her brother.  Social History     Tobacco Use   • Smoking status: Never Smoker   • Smokeless tobacco: Never Used   Substance Use Topics   • Alcohol use: No       MEDICATIONS:    Current Outpatient Medications:   •  acetaminophen (TYLENOL) 325 MG tablet, Take 325 mg by mouth 3 (Three) Times a Day As Needed., Disp: , Rfl:   •  apixaban (ELIQUIS) 5 MG tablet tablet, Take 5 mg by mouth 2 (Two) Times a Day., Disp: , Rfl:   •  atorvastatin (LIPITOR) 10 MG tablet, Take 10 mg by mouth daily., Disp: , Rfl:   •  celecoxib (CeleBREX) 100 MG capsule, Take 100 mg by mouth 2 (Two) Times a Day. Takes 1/2 tablet twice a day, Disp: , Rfl:   •  Cetirizine HCl (ZYRTEC PO), Take  by mouth., Disp: , Rfl:   •  doxazosin (CARDURA) 1 MG tablet, Take 1 mg by mouth Daily., Disp: , Rfl:   •  famotidine (PEPCID) 40 MG tablet, , Disp: , Rfl:   •  fluticasone (FLONASE) 50 MCG/ACT nasal spray, 2 sprays into the nostril(s) as directed by provider Daily. Administer 2 sprays in each nostril for each dose. , Disp: , Rfl:   •  hydrochlorothiazide (HYDRODIURIL) 12.5 MG tablet, Take 12.5 mg by mouth Every Morning., Disp: , Rfl:   •  irbesartan (AVAPRO) 300 MG tablet, Take 300 mg by mouth Daily., Disp: , Rfl:   •  levothyroxine (SYNTHROID, LEVOTHROID) 150 MCG tablet, Take 150 mcg by mouth  "Daily., Disp: , Rfl:   •  metoprolol succinate XL (TOPROL-XL) 100 MG 24 hr tablet, Take 150 mg by mouth Daily., Disp: , Rfl:   •  timolol (TIMOPTIC) 0.5 % ophthalmic solution, Administer 1 drop to both eyes Daily., Disp: , Rfl:   •  timolol (TIMOPTIC-XR) 0.5 % ophthalmic gel-forming, , Disp: , Rfl:   •  omeprazole (PriLOSEC) 20 MG capsule, Take 20 mg by mouth 2 (Two) Times a Day., Disp: , Rfl:     ALLERGIES:  Allergies   Allergen Reactions   • Penicillin V Potassium Swelling     Throat swelled   • Latex Itching         Objective   VITAL SIGNS:     Vitals:    03/26/19 1243   BP: 140/90   Pulse: 84   Resp: 16   Temp: 97.5 °F (36.4 °C)   TempSrc: Oral   SpO2: 100%   Weight: 73.7 kg (162 lb 6.4 oz)   Height: 167 cm (65.75\")   PainSc: 0-No pain     Body mass index is 26.41 kg/m².     Wt Readings from Last 3 Encounters:   03/26/19 73.7 kg (162 lb 6.4 oz)   10/09/18 74.9 kg (165 lb 3.2 oz)   07/24/18 72.6 kg (160 lb)       PHYSICAL EXAMINATION:  GENERAL:  The patient appears in good general condition, not in acute distress.  SKIN: Warm and dry. No skin rashes, ecchymosis or petechiae.  HEAD:  Normocephalic.  EYES:  No Jaundice. No Pallor.  NECK:  Supple with Good ROM. No Thyromegaly. No Masses.  LYMPHATICS:  No cervical or supraclavicular lymphadenopathy.  CHEST: Normal respiratory effort. Lungs clear to auscultation.   CARDIAC:  Normal S1 & S2. No murmurs. No edema.  ABDOMEN:  Soft. No tenderness. No Hepatomegaly. No Splenomegaly. No masses.  EXTREMITIES: Osteoarthritic changes in the fingers bilaterally. No Calf tenderness.  NEUROLOGICAL:  No Focal neurological deficits.         DIAGNOSTIC DATA:     Results from last 7 days   Lab Units 03/19/19  1327   WBC 10*3/mm3 5.89   NEUTROS ABS 10*3/mm3 2.90   HEMOGLOBIN g/dL 11.6*   HEMATOCRIT % 34.7   PLATELETS 10*3/mm3 121*     Results from last 7 days   Lab Units 03/19/19  1327   SODIUM mmol/L 129*   POTASSIUM mmol/L 4.9*   CHLORIDE mmol/L 94*   CO2 mmol/L 25.4   BUN mg/dL 24* "   CREATININE mg/dL 0.80   CALCIUM mg/dL 10.0   ALBUMIN g/dL 3.80  3.6   BILIRUBIN mg/dL 0.5   ALK PHOS U/L 57   ALT (SGPT) U/L 9   AST (SGOT) U/L 17   GLUCOSE mg/dL 92   FERRITIN ng/mL 100.30   IRON mcg/dL 84   TIBC mcg/dL 252         Component      Latest Ref Rng & Units 5/16/2017 4/4/2018 10/2/2018 3/19/2019   IgG      700 - 1600 mg/dL 2491 (H) 2875 (H) 3151 (H) 3284 (H)   IgA      64 - 422 mg/dL 66 56 (L) 50 (L) 39 (L)   IgM      26 - 217 mg/dL 19 (L) 17 (L) 14 (L) 14 (L)   Total Protein      6.0 - 8.5 g/dL 7.1 7.9 7.9 8.1   Albumin      2.9 - 4.4 g/dL 3.5 3.8 3.9 3.6   Alpha-1-Globulin      0.0 - 0.4 g/dL 0.2 0.2 0.2 0.2   Alpha-2-Globulin      0.4 - 1.0 g/dL 0.6 0.7 0.6 0.7   Beta Globulin      0.7 - 1.3 g/dL 0.7 0.7 0.7 0.7   Gamma Globulin      0.4 - 1.8 g/dL 2.1 (H) 2.5 (H) 2.6 (H) 2.8 (H)   M-William      Not Observed g/dL 1.9 (H) 2.4 (H) 2.4 (H) 2.7 (H)   Globulin      2.2 - 3.9 g/dL 3.6 4.1 (H) 4.0 (H) 4.5 (H)   A/G Ratio      0.7 - 1.7 1.0 1.0 1.0 0.9   Immunofixation Reflex, Serum       Comment Comment Comment Comment   Please Note       Comment Comment Comment Comment   Kappa FLC      3.3 - 19.4 mg/L 35.58 (H) 40.1 (H) 36.2 (H) 43.3 (H)   Lambda FLC      5.7 - 26.3 mg/L 10.18 8.9 8.8 7.5   Kappa/Lambda Ratio      0.26 - 1.65 3.50 (H) 4.51 (H) 4.11 (H) 5.77 (H)         Assessment/Plan   1.  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  M protein increased to 2.7.  The flow stable at 2.4 and 2018.  Kidney function remains normal.  Calcium is at the upper end of normal.  She is mildly anemic but hemoglobin has overall improved.  There are no signs to suggest transformation to multiple myeloma but I recommended closer monitoring of her labs due to the increase in the M protein.    2.  Anemia.  She has history of iron deficiency anemia.  Iron stores are normal.  She was found to have B12 deficiency of the labs done by her rheumatologist.    3.  Hyponatremia.  This is likely due to  hydrochlorothiazide.  Sodium is relatively stable.      PLAN:    1.  Start B12 1000 mcg daily.    2.  I recommended repeating SPEP, JARED, CBC CMP beta-2 microglobulin B12 levels in August 2019.  We will see her in follow-up 1 week after to review the results.        Hanna Santana MD  03/26/19

## 2019-08-13 ENCOUNTER — APPOINTMENT (OUTPATIENT)
Dept: LAB | Facility: HOSPITAL | Age: 84
End: 2019-08-13

## 2019-08-14 ENCOUNTER — OFFICE VISIT (OUTPATIENT)
Dept: FAMILY MEDICINE CLINIC | Facility: CLINIC | Age: 84
End: 2019-08-14

## 2019-08-14 VITALS
TEMPERATURE: 97.5 F | OXYGEN SATURATION: 97 % | HEART RATE: 77 BPM | DIASTOLIC BLOOD PRESSURE: 96 MMHG | SYSTOLIC BLOOD PRESSURE: 130 MMHG

## 2019-08-14 DIAGNOSIS — I10 ESSENTIAL HYPERTENSION: ICD-10-CM

## 2019-08-14 DIAGNOSIS — J90 PLEURAL EFFUSION: Primary | ICD-10-CM

## 2019-08-14 DIAGNOSIS — I48.19 PERSISTENT ATRIAL FIBRILLATION (HCC): ICD-10-CM

## 2019-08-14 PROBLEM — E03.9 ACQUIRED HYPOTHYROIDISM: Status: ACTIVE | Noted: 2019-08-14

## 2019-08-14 PROBLEM — Z95.0 NORMALLY FUNCTIONING CARDIAC PACEMAKER PRESENT: Status: ACTIVE | Noted: 2019-08-14

## 2019-08-14 PROBLEM — I48.91 ATRIAL FIBRILLATION (HCC): Status: ACTIVE | Noted: 2019-08-14

## 2019-08-14 PROBLEM — I07.1 TRICUSPID REGURGITATION: Status: ACTIVE | Noted: 2019-08-14

## 2019-08-14 LAB
BUN SERPL-MCNC: 27 MG/DL (ref 8–23)
BUN/CREAT SERPL: 24.5 (ref 7–25)
CALCIUM SERPL-MCNC: 9.8 MG/DL (ref 8.6–10.5)
CHLORIDE SERPL-SCNC: 102 MMOL/L (ref 98–107)
CO2 SERPL-SCNC: 28.3 MMOL/L (ref 22–29)
CREAT SERPL-MCNC: 1.1 MG/DL (ref 0.57–1)
GLUCOSE SERPL-MCNC: 94 MG/DL (ref 65–99)
POTASSIUM SERPL-SCNC: 4.3 MMOL/L (ref 3.5–5.2)
SODIUM SERPL-SCNC: 141 MMOL/L (ref 136–145)

## 2019-08-14 PROCEDURE — 99214 OFFICE O/P EST MOD 30 MIN: CPT | Performed by: FAMILY MEDICINE

## 2019-08-14 RX ORDER — BUMETANIDE 1 MG/1
0.5 TABLET ORAL DAILY
COMMUNITY

## 2019-08-14 NOTE — PROGRESS NOTES
Subjective   Isabell Angel is a 84 y.o. female.     Chief Complaint   Patient presents with   • Follow-up     Was in the hospital for dyspnea, and PNA. pt doesn't know what medication she is onj        Patient presents for follow-up effusion.  I saw her at my previous office for follow-up of a hospitalization.  She is feeling much better.  She has already seen pulmonary and she has a PFT scheduled.  She denies any return of shortness of breath.  She did go to the ER once since I last saw her and they told her to stop her hydrochlorothiazide because she was dehydrated.  I do not have those records available for review.           The following portions of the patient's history were reviewed and updated as appropriate: allergies, current medications, past family history, past medical history, past social history, past surgical history and problem list.    Past Medical History:   Diagnosis Date   • Acid reflux    • Anemia     iron deficiency anemia, resolved   • Aortic aneurysm (CMS/HCC)     MONITORED   • Arthritis    • Atrial fibrillation (CMS/HCC)    • Colon polyp    • Coronary artery disease    • Disease of thyroid gland    • Glaucoma    • Glenohumeral arthritis, right    • History of iron deficiency anemia    • Hypercalcemia     Due to increased calcium intake in addition to calcium supplements   • Hypercholesterolemia    • Hypertension    • IgG monoclonal gammopathy     IgG kappa type   • Irregular heart beat     PACEMAKER       Past Surgical History:   Procedure Laterality Date   • ABDOMINAL SURGERY  09/18/2012    Pelvic abscess draining with appendectomy and resection and anastomosis of small intestine   • APPENDECTOMY  09/18/2012   • CHOLECYSTECTOMY  08/31/2012   • COLONOSCOPY N/A 4/13/2016    Procedure: COLONOSCOPY to cecum;  Surgeon: Pro Paredes MD;  Location: [facility] ENDOSCOPY;  Service:    • HYSTERECTOMY  1984   • PACEMAKER IMPLANTATION         Family History   Problem Relation Age of Onset    • Cancer Brother    • Hypertension Mother    • Heart disease Father    • Hypertension Sister        Social History     Socioeconomic History   • Marital status:      Spouse name: Not on file   • Number of children: Not on file   • Years of education: Not on file   • Highest education level: Not on file   Occupational History     Employer: RETIRED   Tobacco Use   • Smoking status: Never Smoker   • Smokeless tobacco: Never Used   Substance and Sexual Activity   • Alcohol use: No   • Drug use: No   • Sexual activity: Defer   Social History Narrative    The patient is a . She is a retired . She does not smoke or drink.       Review of Systems   Constitutional: Negative for chills, fatigue and fever.   HENT: Negative for congestion, rhinorrhea and sore throat.    Respiratory: Negative for cough and shortness of breath.    Cardiovascular: Negative for chest pain and leg swelling.   Gastrointestinal: Negative for abdominal pain.   Endocrine: Negative for polydipsia and polyuria.   Genitourinary: Negative for dysuria.   Musculoskeletal: Negative for arthralgias and myalgias.   Skin: Negative for rash.   Neurological: Negative for dizziness.   Hematological: Does not bruise/bleed easily.   Psychiatric/Behavioral: Negative for sleep disturbance.       Objective   Vitals:    08/14/19 1040   BP: 130/96   Pulse: 77   Temp: 97.5 °F (36.4 °C)   SpO2: 97%     There is no height or weight on file to calculate BMI.  Physical Exam   Constitutional: She is oriented to person, place, and time. She appears well-developed and well-nourished.   HENT:   Head: Normocephalic and atraumatic.   Eyes: Conjunctivae and EOM are normal. Pupils are equal, round, and reactive to light.   Neck: Neck supple.   Cardiovascular: Normal rate and regular rhythm.   Murmur heard.  Pacer present   Pulmonary/Chest: Effort normal. No tachypnea. No respiratory distress. She has decreased breath sounds in the left lower field.    Musculoskeletal: She exhibits no edema.   Neurological: She is alert and oriented to person, place, and time.   Skin: Skin is warm and dry. She is not diaphoretic.   Psychiatric: She has a normal mood and affect.         Assessment/Plan   Isabell was seen today for follow-up.    Diagnoses and all orders for this visit:    Pleural effusion    Essential hypertension    Persistent atrial fibrillation (CMS/HCC)               Patient Instructions   Follow up pending lab results and ER record review.

## 2019-08-15 ENCOUNTER — LAB (OUTPATIENT)
Dept: LAB | Facility: HOSPITAL | Age: 84
End: 2019-08-15

## 2019-08-15 DIAGNOSIS — D47.2 IGG MONOCLONAL GAMMOPATHY: ICD-10-CM

## 2019-08-15 DIAGNOSIS — E53.8 B12 DEFICIENCY: ICD-10-CM

## 2019-08-15 DIAGNOSIS — D69.6 THROMBOCYTOPENIA (HCC): ICD-10-CM

## 2019-08-15 DIAGNOSIS — Z86.2 H/O: IRON DEFICIENCY ANEMIA: ICD-10-CM

## 2019-08-15 LAB
ALBUMIN SERPL-MCNC: 3.7 G/DL (ref 3.5–5.2)
ALBUMIN/GLOB SERPL: 0.9 G/DL (ref 1.1–2.4)
ALP SERPL-CCNC: 51 U/L (ref 38–116)
ALT SERPL W P-5'-P-CCNC: 11 U/L (ref 0–33)
ANION GAP SERPL CALCULATED.3IONS-SCNC: 10.5 MMOL/L (ref 5–15)
AST SERPL-CCNC: 19 U/L (ref 0–32)
B2 MICROGLOB SERPL-MCNC: 11.6 MG/L (ref 0.8–2.2)
BASOPHILS # BLD AUTO: 0.01 10*3/MM3 (ref 0–0.2)
BASOPHILS NFR BLD AUTO: 0.2 % (ref 0–1.5)
BILIRUB SERPL-MCNC: 0.5 MG/DL (ref 0.2–1.2)
BUN BLD-MCNC: 26 MG/DL (ref 6–20)
BUN/CREAT SERPL: 22 (ref 7.3–30)
CALCIUM SPEC-SCNC: 9.7 MG/DL (ref 8.5–10.2)
CHLORIDE SERPL-SCNC: 101 MMOL/L (ref 98–107)
CO2 SERPL-SCNC: 26.5 MMOL/L (ref 22–29)
CREAT BLD-MCNC: 1.18 MG/DL (ref 0.6–1.1)
DEPRECATED RDW RBC AUTO: 57 FL (ref 37–54)
EOSINOPHIL # BLD AUTO: 0.04 10*3/MM3 (ref 0–0.4)
EOSINOPHIL NFR BLD AUTO: 0.8 % (ref 0.3–6.2)
ERYTHROCYTE [DISTWIDTH] IN BLOOD BY AUTOMATED COUNT: 15.1 % (ref 12.3–15.4)
GFR SERPL CREATININE-BSD FRML MDRD: 44 ML/MIN/1.73
GLOBULIN UR ELPH-MCNC: 4.3 GM/DL (ref 1.8–3.5)
GLUCOSE BLD-MCNC: 93 MG/DL (ref 74–124)
HCT VFR BLD AUTO: 37.4 % (ref 34–46.6)
HGB BLD-MCNC: 11.5 G/DL (ref 12–15.9)
IMM GRANULOCYTES # BLD AUTO: 0.01 10*3/MM3 (ref 0–0.05)
IMM GRANULOCYTES NFR BLD AUTO: 0.2 % (ref 0–0.5)
LYMPHOCYTES # BLD AUTO: 2.3 10*3/MM3 (ref 0.7–3.1)
LYMPHOCYTES NFR BLD AUTO: 44.2 % (ref 19.6–45.3)
MCH RBC QN AUTO: 31.3 PG (ref 26.6–33)
MCHC RBC AUTO-ENTMCNC: 30.7 G/DL (ref 31.5–35.7)
MCV RBC AUTO: 101.6 FL (ref 79–97)
MONOCYTES # BLD AUTO: 0.65 10*3/MM3 (ref 0.1–0.9)
MONOCYTES NFR BLD AUTO: 12.5 % (ref 5–12)
NEUTROPHILS # BLD AUTO: 2.19 10*3/MM3 (ref 1.7–7)
NEUTROPHILS NFR BLD AUTO: 42.1 % (ref 42.7–76)
NRBC BLD AUTO-RTO: 0 /100 WBC (ref 0–0.2)
PLATELET # BLD AUTO: 130 10*3/MM3 (ref 140–450)
PMV BLD AUTO: 10.4 FL (ref 6–12)
POTASSIUM BLD-SCNC: 4.5 MMOL/L (ref 3.5–4.7)
PROT SERPL-MCNC: 8 G/DL (ref 6.3–8)
RBC # BLD AUTO: 3.68 10*6/MM3 (ref 3.77–5.28)
SODIUM BLD-SCNC: 138 MMOL/L (ref 134–145)
VIT B12 BLD-MCNC: 822 PG/ML (ref 211–946)
WBC NRBC COR # BLD: 5.2 10*3/MM3 (ref 3.4–10.8)

## 2019-08-15 PROCEDURE — 80053 COMPREHEN METABOLIC PANEL: CPT

## 2019-08-15 PROCEDURE — 85025 COMPLETE CBC W/AUTO DIFF WBC: CPT

## 2019-08-15 PROCEDURE — 82607 VITAMIN B-12: CPT | Performed by: INTERNAL MEDICINE

## 2019-08-15 PROCEDURE — 82232 ASSAY OF BETA-2 PROTEIN: CPT | Performed by: INTERNAL MEDICINE

## 2019-08-15 PROCEDURE — 36415 COLL VENOUS BLD VENIPUNCTURE: CPT

## 2019-08-16 LAB
ALBUMIN SERPL-MCNC: 3.4 G/DL (ref 2.9–4.4)
ALBUMIN/GLOB SERPL: 0.9 {RATIO} (ref 0.7–1.7)
ALPHA1 GLOB FLD ELPH-MCNC: 0.2 G/DL (ref 0–0.4)
ALPHA2 GLOB SERPL ELPH-MCNC: 0.6 G/DL (ref 0.4–1)
B-GLOBULIN SERPL ELPH-MCNC: 0.7 G/DL (ref 0.7–1.3)
GAMMA GLOB SERPL ELPH-MCNC: 2.6 G/DL (ref 0.4–1.8)
GLOBULIN SER CALC-MCNC: 4.1 G/DL (ref 2.2–3.9)
IGA SERPL-MCNC: 34 MG/DL (ref 64–422)
IGG SERPL-MCNC: 3212 MG/DL (ref 700–1600)
IGM SERPL-MCNC: 11 MG/DL (ref 26–217)
INTERPRETATION SERPL IEP-IMP: ABNORMAL
KAPPA LC SERPL-MCNC: 79.3 MG/L (ref 3.3–19.4)
KAPPA LC/LAMBDA SER: 7.77 {RATIO} (ref 0.26–1.65)
LAMBDA LC FREE SERPL-MCNC: 10.2 MG/L (ref 5.7–26.3)
Lab: ABNORMAL
M-SPIKE: 2.3 G/DL
PROT SERPL-MCNC: 7.5 G/DL (ref 6–8.5)

## 2019-08-23 RX ORDER — IRBESARTAN 300 MG/1
300 TABLET ORAL DAILY
Qty: 90 TABLET | Refills: 0 | Status: SHIPPED | OUTPATIENT
Start: 2019-08-23 | End: 2019-11-10 | Stop reason: SDUPTHER

## 2019-08-26 RX ORDER — LEVOTHYROXINE SODIUM 0.15 MG/1
150 TABLET ORAL DAILY
Qty: 90 TABLET | Refills: 0 | Status: SHIPPED | OUTPATIENT
Start: 2019-08-26 | End: 2019-11-30 | Stop reason: SDUPTHER

## 2019-08-27 ENCOUNTER — APPOINTMENT (OUTPATIENT)
Dept: LAB | Facility: HOSPITAL | Age: 84
End: 2019-08-27

## 2019-08-27 ENCOUNTER — OFFICE VISIT (OUTPATIENT)
Dept: ONCOLOGY | Facility: CLINIC | Age: 84
End: 2019-08-27

## 2019-08-27 VITALS
BODY MASS INDEX: 24.86 KG/M2 | SYSTOLIC BLOOD PRESSURE: 124 MMHG | OXYGEN SATURATION: 95 % | HEART RATE: 77 BPM | HEIGHT: 66 IN | WEIGHT: 154.7 LBS | DIASTOLIC BLOOD PRESSURE: 77 MMHG | RESPIRATION RATE: 16 BRPM | TEMPERATURE: 97.5 F

## 2019-08-27 DIAGNOSIS — N17.9 ACUTE KIDNEY INJURY (HCC): ICD-10-CM

## 2019-08-27 DIAGNOSIS — E53.8 B12 DEFICIENCY: ICD-10-CM

## 2019-08-27 DIAGNOSIS — D47.2 IGG MONOCLONAL GAMMOPATHY: Primary | ICD-10-CM

## 2019-08-27 PROCEDURE — 99214 OFFICE O/P EST MOD 30 MIN: CPT | Performed by: INTERNAL MEDICINE

## 2019-08-27 PROCEDURE — G0463 HOSPITAL OUTPT CLINIC VISIT: HCPCS | Performed by: INTERNAL MEDICINE

## 2019-08-27 NOTE — PROGRESS NOTES
Subjective     CHIEF COMPLAINT:      Chief Complaint   Patient presents with   • Follow-up     no concerns       HISTORY OF PRESENT ILLNESS:     Isabell Angel is a 84 y.o. female patient who returns today for follow up on her monoclonal gammopathy.  She was hospitalized in [facility] with pneumonia and was found to have right pleural effusion.  She had thoracentesis done with removal of 2 L.  She was also found to have fluid overload and was started on Bumex.  It was initially started at 1 mg a day.  Due to hypotension that required an ER visit, the dose was reduced to 0.5 mg a day.    Patient reports improvement in the shortness of breath.  She is not on oxygen and is going to have evaluation of her oxygen level tomorrow.    Patient reports occasional arthritis.  She denies any new areas of bone pain.        REVIEW OF SYSTEMS:  Review of Systems   Constitutional: Negative for chills, fever and unexpected weight change.   HENT: Negative for mouth sores, nosebleeds, sore throat and voice change.    Eyes: Negative for visual disturbance.   Respiratory: Negative for cough and shortness of breath.    Cardiovascular: Negative for chest pain and leg swelling.   Gastrointestinal: Negative for abdominal pain, blood in stool, constipation, diarrhea, nausea and vomiting.   Genitourinary: Negative for dysuria, frequency and hematuria.   Musculoskeletal: Negative for arthralgias, back pain and joint swelling.   Skin: Negative for rash.   Neurological: Negative for dizziness, numbness and headaches.   Hematological: Negative for adenopathy. Does not bruise/bleed easily.   Psychiatric/Behavioral: Negative for dysphoric mood. The patient is not nervous/anxious.      I verified the ROS obtained by the MA.      Past Medical History:   Diagnosis Date   • Acid reflux    • Anemia     iron deficiency anemia, resolved   • Aortic aneurysm (CMS/HCC)     MONITORED   • Arthritis    • Atrial fibrillation (CMS/HCC)    • Colon polyp     • Coronary artery disease    • Disease of thyroid gland    • Glaucoma    • Glenohumeral arthritis, right    • History of iron deficiency anemia    • Hypercalcemia     Due to increased calcium intake in addition to calcium supplements   • Hypercholesterolemia    • Hypertension    • IgG monoclonal gammopathy     IgG kappa type   • Irregular heart beat     PACEMAKER       Past Surgical History:   Procedure Laterality Date   • ABDOMINAL SURGERY  09/18/2012    Pelvic abscess draining with appendectomy and resection and anastomosis of small intestine   • APPENDECTOMY  09/18/2012   • CHOLECYSTECTOMY  08/31/2012   • COLONOSCOPY N/A 4/13/2016    Procedure: COLONOSCOPY to cecum;  Surgeon: Pro Paredes MD;  Location: [facility] ENDOSCOPY;  Service:    • HYSTERECTOMY  1984   • PACEMAKER IMPLANTATION         Cancer-related family history includes Cancer in her brother.  Social History     Tobacco Use   • Smoking status: Never Smoker   • Smokeless tobacco: Never Used   Substance Use Topics   • Alcohol use: No       MEDICATIONS:    Current Outpatient Medications:   •  acetaminophen (TYLENOL) 325 MG tablet, Take 325 mg by mouth 3 (Three) Times a Day As Needed., Disp: , Rfl:   •  apixaban (ELIQUIS) 5 MG tablet tablet, Take 5 mg by mouth 2 (Two) Times a Day., Disp: , Rfl:   •  atorvastatin (LIPITOR) 10 MG tablet, Take 10 mg by mouth daily., Disp: , Rfl:   •  bumetanide (BUMEX) 1 MG tablet, Take 0.5 mg by mouth Daily., Disp: , Rfl:   •  celecoxib (CeleBREX) 100 MG capsule, Take 100 mg by mouth 2 (Two) Times a Day. Takes 1/2 tablet twice a day, Disp: , Rfl:   •  Cetirizine HCl (ZYRTEC PO), Take  by mouth., Disp: , Rfl:   •  doxazosin (CARDURA) 1 MG tablet, Take 1 mg by mouth Daily., Disp: , Rfl:   •  famotidine (PEPCID) 40 MG tablet, , Disp: , Rfl:   •  fluticasone (FLONASE) 50 MCG/ACT nasal spray, 2 sprays into the nostril(s) as directed by provider Daily. Administer 2 sprays in each nostril for each dose. , Disp: , Rfl:   •  " irbesartan (AVAPRO) 300 MG tablet, Take 1 tablet by mouth Daily., Disp: 90 tablet, Rfl: 0  •  levothyroxine (SYNTHROID, LEVOTHROID) 150 MCG tablet, Take 1 tablet by mouth Daily., Disp: 90 tablet, Rfl: 0  •  metoprolol succinate XL (TOPROL-XL) 100 MG 24 hr tablet, Take 150 mg by mouth Daily., Disp: , Rfl:   •  omeprazole (PriLOSEC) 20 MG capsule, Take 20 mg by mouth 2 (Two) Times a Day., Disp: , Rfl:   •  timolol (TIMOPTIC) 0.5 % ophthalmic solution, Administer 1 drop to both eyes Daily., Disp: , Rfl:   •  timolol (TIMOPTIC-XR) 0.5 % ophthalmic gel-forming, , Disp: , Rfl:   •  vitamin B-12 (CYANOCOBALAMIN) 1000 MCG tablet, Take 1 tablet by mouth Daily., Disp: , Rfl:     ALLERGIES:  Allergies   Allergen Reactions   • Penicillin V Potassium Swelling     Throat swelled   • Latex Itching         Objective   VITAL SIGNS:     Vitals:    08/27/19 1134   BP: 124/77   Pulse: 77   Resp: 16   Temp: 97.5 °F (36.4 °C)   TempSrc: Oral   SpO2: 95%   Weight: 70.2 kg (154 lb 11.2 oz)   Height: 167 cm (65.75\")   PainSc: 0-No pain     Body mass index is 25.16 kg/m².     Wt Readings from Last 3 Encounters:   08/27/19 70.2 kg (154 lb 11.2 oz)   03/26/19 73.7 kg (162 lb 6.4 oz)   10/09/18 74.9 kg (165 lb 3.2 oz)       PHYSICAL EXAMINATION:  GENERAL:  The patient appears in good general condition, not in acute distress.  SKIN: Warm and dry. No skin rashes. No ecchymosis.  HEAD:  Normocephalic.  EYES:  No Jaundice. No Pallor.   NECK:  Supple with Good ROM. No Thyromegaly. No Masses.  LYMPHATICS:  No cervical or supraclavicular lymphadenopathy.  CHEST: Normal respiratory effort. Lungs clear to auscultation.   CARDIAC:  No edema.  ABDOMEN:  Soft. No tenderness. No Hepatomegaly. No Splenomegaly. No masses.  EXTREMITIES:  No clubbing. No deforming arthritis in the hands. No Calf tenderness.  NEUROLOGICAL:  No Focal neurological deficits.         DIAGNOSTIC DATA:     Component      Latest Ref Rng & Units 3/19/2019 8/15/2019   WBC      3.40 - " 10.80 10*3/mm3 5.89 5.20   RBC      3.77 - 5.28 10*6/mm3 3.53 (L) 3.68 (L)   Hemoglobin      12.0 - 15.9 g/dL 11.6 (L) 11.5 (L)   Hematocrit      34.0 - 46.6 % 34.7 37.4   MCV      79.0 - 97.0 fL 98.3 (H) 101.6 (H)   MCH      26.6 - 33.0 pg 32.9 31.3   MCHC      31.5 - 35.7 g/dL 33.4 30.7 (L)   RDW      12.3 - 15.4 % 12.2 (L) 15.1   RDW-SD      37.0 - 54.0 fl 44.0 57.0 (H)   MPV      6.0 - 12.0 fL 9.4 10.4   Platelets      140 - 450 10*3/mm3 121 (L) 130 (L)   Neutrophil Rel %      42.7 - 76.0 % 49.2 42.1 (L)   Lymphocyte Rel %      19.6 - 45.3 % 37.2 44.2   Monocyte Rel %      5.0 - 12.0 % 12.2 (H) 12.5 (H)   Eosinophil Rel %      0.3 - 6.2 % 1.0 0.8   Basophil Rel %      0.0 - 1.5 % 0.2 0.2   Immature Granulocyte Rel %      0.0 - 0.5 % 0.2 0.2   Neutrophils Absolute      1.70 - 7.00 10*3/mm3 2.90 2.19   Lymphocytes Absolute      0.70 - 3.10 10*3/mm3 2.19 2.30   Monocytes Absolute      0.10 - 0.90 10*3/mm3 0.72 0.65   Eosinophils Absolute      0.00 - 0.40 10*3/mm3 0.06 0.04   Basophils Absolute      0.00 - 0.20 10*3/mm3 0.01 0.01   Immature Grans, Absolute      0.00 - 0.05 10*3/mm3 0.01 0.01   nRBC      0.0 - 0.2 /100 WBC 0.0 0.0   Glucose      74 - 124 mg/dL 92 93   BUN      6 - 20 mg/dL 24 (H) 26 (H)   Creatinine      0.60 - 1.10 mg/dL 0.80 1.18 (H)   Sodium      134 - 145 mmol/L 129 (LL) 138   Potassium      3.5 - 4.7 mmol/L 4.9 (H) 4.5   Chloride      98 - 107 mmol/L 94 (L) 101   CO2      22.0 - 29.0 mmol/L 25.4 26.5   Calcium      8.5 - 10.2 mg/dL 10.0 9.7   Total Protein      6.3 - 8.0 g/dL 8.4 (H) 8.0   Albumin      3.50 - 5.20 g/dL 3.80 3.70   ALT (SGPT)      0 - 33 U/L 9 11   AST (SGOT)      0 - 32 U/L 17 19   Alkaline Phosphatase      38 - 116 U/L 57 51   Total Bilirubin      0.2 - 1.2 mg/dL 0.5 0.5   eGFR Non African Am      >60 mL/min/1.73 68 44 (L)   Globulin      1.8 - 3.5 gm/dL 4.6 (H) 4.3 (H)   A/G Ratio      1.1 - 2.4 g/dL 0.8 (L) 0.9 (L)   BUN/Creatinine Ratio      7.3 - 30.0 30.0 22.0   Anion  Gap      5.0 - 15.0 mmol/L 9.6 10.5       Component      Latest Ref Rng & Units 4/4/2018 10/2/2018 3/19/2019 8/15/2019   IgG      700 - 1600 mg/dL 2875 (H) 3151 (H) 3284 (H) 3212 (H)   IgA      64 - 422 mg/dL 56 (L) 50 (L) 39 (L) 34 (L)   IgM      26 - 217 mg/dL 17 (L) 14 (L) 14 (L) 11 (L)   Total Protein      6.0 - 8.5 g/dL 7.9 7.9 8.1 7.5   Albumin      2.9 - 4.4 g/dL 3.8 3.9 3.6 3.4   Alpha-1-Globulin      0.0 - 0.4 g/dL 0.2 0.2 0.2 0.2   Alpha-2-Globulin      0.4 - 1.0 g/dL 0.7 0.6 0.7 0.6   Beta Globulin      0.7 - 1.3 g/dL 0.7 0.7 0.7 0.7   Gamma Globulin      0.4 - 1.8 g/dL 2.5 (H) 2.6 (H) 2.8 (H) 2.6 (H)   M-William      Not Observed g/dL 2.4 (H) 2.4 (H) 2.7 (H) 2.3 (H)   Globulin      2.2 - 3.9 g/dL 4.1 (H) 4.0 (H) 4.5 (H) 4.1 (H)   A/G Ratio      0.7 - 1.7 1.0 1.0 0.9 0.9   Immunofixation Reflex, Serum       Comment Comment Comment Comment   Please note       Comment Comment Comment Comment   Kappa FLC      3.3 - 19.4 mg/L 40.1 (H) 36.2 (H) 43.3 (H) 79.3 (H)   Free Lambda Light Chains      5.7 - 26.3 mg/L 8.9 8.8 7.5 10.2   Kappa/Lambda Ratio      0.26 - 1.65 4.51 (H) 4.11 (H) 5.77 (H) 7.77 (H)       Cytology and cultures from pleural fluid from [facility] were negative.    Assessment/Plan   1.  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  M protein increased to 2.7 on 3/19/2019.  It decreased to 2.2 on 8/15/2019.  However, patient's kidney function has worsened and creatinine is now up to 1.18.  Beta-2 microglobulin increased to 11.6.  Although the worsening can be explained by the patient's recent fluid overload necessitating an increase in the dose of her diuretics, we need to rule out transformation to multiple myeloma.    2.  Anemia.  She has mild B12 deficiency and she is on B12 once daily.  Hemoglobin stable around 11.5.    3.  Acute kidney injury.  As above, this is likely secondary to the diuretics.      PLAN:    1.  I recommended close monitoring of her  paraproteins.  I also recommended obtaining skeletal survey to evaluate for lytic lesions.  2.  Continue B12 1000 mcg daily.  3.  I will see her in follow-up in 6 weeks.  She will have CBC CMP SPEP JARED FLC as well as beta-2 microglobulin uric acid and B12 1 week before her return visit.      Hanna Santana MD  08/27/19

## 2019-09-10 ENCOUNTER — TELEPHONE (OUTPATIENT)
Dept: OTHER | Facility: OTHER | Age: 84
End: 2019-09-10

## 2019-09-10 NOTE — TELEPHONE ENCOUNTER
Patient states that the last time that she was seen by Dr. Kehrer she had mentioned something about getting tests done. She says that it was related to a recent hospital admission she had where she had fluid on her lungs. She is requesting a call back to discuss. Please advise. Patient states that she may not be home this afternoon (9/10) but you can leave a message. States that she will be available all day tomorrow.

## 2019-09-10 NOTE — TELEPHONE ENCOUNTER
Can you please help? I looked through the note and it only mentioned that she already sees pulmonary and they had scheduled her for a PFT unless I missed something.

## 2019-09-23 RX ORDER — DOXAZOSIN 2 MG/1
2 TABLET ORAL NIGHTLY
Qty: 90 TABLET | Refills: 0 | Status: SHIPPED | OUTPATIENT
Start: 2019-09-23 | End: 2019-12-26

## 2019-10-01 ENCOUNTER — LAB (OUTPATIENT)
Dept: LAB | Facility: HOSPITAL | Age: 84
End: 2019-10-01

## 2019-10-01 DIAGNOSIS — D47.2 IGG MONOCLONAL GAMMOPATHY: ICD-10-CM

## 2019-10-01 DIAGNOSIS — E53.8 B12 DEFICIENCY: ICD-10-CM

## 2019-10-01 DIAGNOSIS — N17.9 ACUTE KIDNEY INJURY (HCC): ICD-10-CM

## 2019-10-01 LAB
ALBUMIN SERPL-MCNC: 3.7 G/DL (ref 3.5–5.2)
ALBUMIN/GLOB SERPL: 0.8 G/DL (ref 1.1–2.4)
ALP SERPL-CCNC: 68 U/L (ref 38–116)
ALT SERPL W P-5'-P-CCNC: 14 U/L (ref 0–33)
ANION GAP SERPL CALCULATED.3IONS-SCNC: 9.9 MMOL/L (ref 5–15)
AST SERPL-CCNC: 18 U/L (ref 0–32)
B2 MICROGLOB SERPL-MCNC: 12.8 MG/L (ref 0.8–2.2)
BASOPHILS # BLD AUTO: 0.01 10*3/MM3 (ref 0–0.2)
BASOPHILS NFR BLD AUTO: 0.2 % (ref 0–1.5)
BILIRUB SERPL-MCNC: 0.6 MG/DL (ref 0.2–1.2)
BUN BLD-MCNC: 30 MG/DL (ref 6–20)
BUN/CREAT SERPL: 27.5 (ref 7.3–30)
CALCIUM SPEC-SCNC: 10.4 MG/DL (ref 8.5–10.2)
CHLORIDE SERPL-SCNC: 101 MMOL/L (ref 98–107)
CO2 SERPL-SCNC: 27.1 MMOL/L (ref 22–29)
CREAT BLD-MCNC: 1.09 MG/DL (ref 0.6–1.1)
DEPRECATED RDW RBC AUTO: 59.6 FL (ref 37–54)
EOSINOPHIL # BLD AUTO: 0.06 10*3/MM3 (ref 0–0.4)
EOSINOPHIL NFR BLD AUTO: 1.2 % (ref 0.3–6.2)
ERYTHROCYTE [DISTWIDTH] IN BLOOD BY AUTOMATED COUNT: 16.3 % (ref 12.3–15.4)
GFR SERPL CREATININE-BSD FRML MDRD: 48 ML/MIN/1.73
GLOBULIN UR ELPH-MCNC: 4.6 GM/DL (ref 1.8–3.5)
GLUCOSE BLD-MCNC: 78 MG/DL (ref 74–124)
HCT VFR BLD AUTO: 36.4 % (ref 34–46.6)
HGB BLD-MCNC: 11.3 G/DL (ref 12–15.9)
IMM GRANULOCYTES # BLD AUTO: 0.01 10*3/MM3 (ref 0–0.05)
IMM GRANULOCYTES NFR BLD AUTO: 0.2 % (ref 0–0.5)
LYMPHOCYTES # BLD AUTO: 1.64 10*3/MM3 (ref 0.7–3.1)
LYMPHOCYTES NFR BLD AUTO: 32.5 % (ref 19.6–45.3)
MCH RBC QN AUTO: 31.4 PG (ref 26.6–33)
MCHC RBC AUTO-ENTMCNC: 31 G/DL (ref 31.5–35.7)
MCV RBC AUTO: 101.1 FL (ref 79–97)
MONOCYTES # BLD AUTO: 0.71 10*3/MM3 (ref 0.1–0.9)
MONOCYTES NFR BLD AUTO: 14.1 % (ref 5–12)
NEUTROPHILS # BLD AUTO: 2.61 10*3/MM3 (ref 1.7–7)
NEUTROPHILS NFR BLD AUTO: 51.8 % (ref 42.7–76)
NRBC BLD AUTO-RTO: 0 /100 WBC (ref 0–0.2)
PLATELET # BLD AUTO: 141 10*3/MM3 (ref 140–450)
PMV BLD AUTO: 9 FL (ref 6–12)
POTASSIUM BLD-SCNC: 3.5 MMOL/L (ref 3.5–4.7)
PROT SERPL-MCNC: 8.3 G/DL (ref 6.3–8)
RBC # BLD AUTO: 3.6 10*6/MM3 (ref 3.77–5.28)
SODIUM BLD-SCNC: 138 MMOL/L (ref 134–145)
URATE SERPL-MCNC: 8.4 MG/DL (ref 2.8–7.4)
VIT B12 BLD-MCNC: 496 PG/ML (ref 211–946)
WBC NRBC COR # BLD: 5.04 10*3/MM3 (ref 3.4–10.8)

## 2019-10-01 PROCEDURE — 82232 ASSAY OF BETA-2 PROTEIN: CPT | Performed by: INTERNAL MEDICINE

## 2019-10-01 PROCEDURE — 85025 COMPLETE CBC W/AUTO DIFF WBC: CPT | Performed by: INTERNAL MEDICINE

## 2019-10-01 PROCEDURE — 84550 ASSAY OF BLOOD/URIC ACID: CPT | Performed by: INTERNAL MEDICINE

## 2019-10-01 PROCEDURE — 36415 COLL VENOUS BLD VENIPUNCTURE: CPT | Performed by: INTERNAL MEDICINE

## 2019-10-01 PROCEDURE — 82607 VITAMIN B-12: CPT | Performed by: INTERNAL MEDICINE

## 2019-10-01 PROCEDURE — 80053 COMPREHEN METABOLIC PANEL: CPT | Performed by: INTERNAL MEDICINE

## 2019-10-02 LAB
ALBUMIN SERPL-MCNC: 3.5 G/DL (ref 2.9–4.4)
ALBUMIN/GLOB SERPL: 0.9 {RATIO} (ref 0.7–1.7)
ALPHA1 GLOB FLD ELPH-MCNC: 0.3 G/DL (ref 0–0.4)
ALPHA2 GLOB SERPL ELPH-MCNC: 0.6 G/DL (ref 0.4–1)
B-GLOBULIN SERPL ELPH-MCNC: 0.6 G/DL (ref 0.7–1.3)
GAMMA GLOB SERPL ELPH-MCNC: 2.7 G/DL (ref 0.4–1.8)
GLOBULIN SER CALC-MCNC: 4.2 G/DL (ref 2.2–3.9)
IGA SERPL-MCNC: 37 MG/DL (ref 64–422)
IGG SERPL-MCNC: 3389 MG/DL (ref 700–1600)
IGM SERPL-MCNC: 12 MG/DL (ref 26–217)
INTERPRETATION SERPL IEP-IMP: ABNORMAL
KAPPA LC SERPL-MCNC: 70.6 MG/L (ref 3.3–19.4)
KAPPA LC/LAMBDA SER: 6.72 {RATIO} (ref 0.26–1.65)
LAMBDA LC FREE SERPL-MCNC: 10.5 MG/L (ref 5.7–26.3)
Lab: ABNORMAL
M-SPIKE: 2.5 G/DL
PROT SERPL-MCNC: 7.7 G/DL (ref 6–8.5)

## 2019-10-03 DIAGNOSIS — J30.9 ALLERGIC RHINITIS, UNSPECIFIED SEASONALITY, UNSPECIFIED TRIGGER: Primary | ICD-10-CM

## 2019-10-03 RX ORDER — FLUTICASONE PROPIONATE 50 MCG
2 SPRAY, SUSPENSION (ML) NASAL DAILY
Qty: 48 ML | Refills: 0 | Status: SHIPPED | OUTPATIENT
Start: 2019-10-03 | End: 2020-02-13 | Stop reason: SDUPTHER

## 2019-10-08 ENCOUNTER — HOSPITAL ENCOUNTER (OUTPATIENT)
Dept: GENERAL RADIOLOGY | Facility: HOSPITAL | Age: 84
Discharge: HOME | End: 2019-10-08
Admitting: INTERNAL MEDICINE

## 2019-10-08 DIAGNOSIS — D47.2 IGG MONOCLONAL GAMMOPATHY: ICD-10-CM

## 2019-10-08 PROCEDURE — 77075 RADEX OSSEOUS SURVEY COMPL: CPT

## 2019-10-15 ENCOUNTER — TELEPHONE (OUTPATIENT)
Dept: ONCOLOGY | Facility: HOSPITAL | Age: 84
End: 2019-10-15

## 2019-10-15 NOTE — TELEPHONE ENCOUNTER
Pt had bone survey and labs at the beginning of October and then she missed her appt with Dr. Santana 10/8. Appt desk rescheduled her to his next available which is 11/8.  She wants to know if Dr. Santana has any further instructions for her between now and her f/u with him, based on her labs and bone survey.  Message sent to Dr. Santana asking if any additional instructions at this time.  Awaiting response.    Dr. Santana responded and said he can see pt tomorrow at 1220pm.  S/W Monica at appt desk.  Pt will need to arrive at noon for her 1220.  I called pt and LM (which she told me to do) and told her to arrive at noon tomorrow and we would see her then unless she called back and said she couldn't be here tomorrow.          ----- Message from Cydney Delgadillo sent at 10/14/2019  6:28 PM EDT -----  Regarding: R/S APPT  Contact: [phone]  This patient missed her appointment w/ Dr Santana on 10/8/19 and called to r/s.  The first available Dr. Santana has is 11/18/19.  The patient was wondering if Dr. Santana or a nurse can call her with her results.  She would like triage to call her.

## 2019-10-16 ENCOUNTER — APPOINTMENT (OUTPATIENT)
Dept: LAB | Facility: HOSPITAL | Age: 84
End: 2019-10-16

## 2019-10-16 ENCOUNTER — OFFICE VISIT (OUTPATIENT)
Dept: ONCOLOGY | Facility: CLINIC | Age: 84
End: 2019-10-16

## 2019-10-16 VITALS
BODY MASS INDEX: 24.81 KG/M2 | WEIGHT: 154.4 LBS | SYSTOLIC BLOOD PRESSURE: 145 MMHG | HEART RATE: 77 BPM | DIASTOLIC BLOOD PRESSURE: 93 MMHG | RESPIRATION RATE: 20 BRPM | OXYGEN SATURATION: 98 % | TEMPERATURE: 98.3 F | HEIGHT: 66 IN

## 2019-10-16 DIAGNOSIS — D51.9 ANEMIA DUE TO VITAMIN B12 DEFICIENCY, UNSPECIFIED B12 DEFICIENCY TYPE: ICD-10-CM

## 2019-10-16 DIAGNOSIS — D47.2 IGG MONOCLONAL GAMMOPATHY: Primary | ICD-10-CM

## 2019-10-16 DIAGNOSIS — E79.0 HYPERURICEMIA: ICD-10-CM

## 2019-10-16 DIAGNOSIS — N17.9 ACUTE KIDNEY INJURY (HCC): ICD-10-CM

## 2019-10-16 PROCEDURE — 99214 OFFICE O/P EST MOD 30 MIN: CPT | Performed by: INTERNAL MEDICINE

## 2019-10-16 PROCEDURE — G0463 HOSPITAL OUTPT CLINIC VISIT: HCPCS | Performed by: INTERNAL MEDICINE

## 2019-10-16 NOTE — PROGRESS NOTES
Subjective     CHIEF COMPLAINT:      Chief Complaint   Patient presents with   • Follow-up       HISTORY OF PRESENT ILLNESS:     Isabell Angel is a 84 y.o. female patient who returns today for follow up on her monoclonal gammopathy. She returns today for follow up and reports occasional pain in the right shoulder joint. It is related to increased activity (doing a lot of packing). No back pain. No shortness of breath. She is on the Bumex at a reduced dose of 0.5 mg daily. No recent episodes of hypotension. She had hypotension after she was discharged from the hospital in August and this was attributed to Bumex which was at 1 mg BID. She was also found to have acute kidney injury.    Patient has a pacemaker. She states that she has been in A fib for the past three months.     REVIEW OF SYSTEMS:  Review of Systems   Constitutional: Negative for chills, fever and unexpected weight change.   HENT: Negative for mouth sores and nosebleeds.    Eyes: Negative for visual disturbance.   Respiratory: Negative for cough and shortness of breath.    Cardiovascular: Positive for palpitations. Negative for chest pain and leg swelling.   Gastrointestinal: Negative for abdominal pain, blood in stool, constipation and diarrhea.   Genitourinary: Negative for dysuria, frequency and hematuria.   Musculoskeletal: Positive for arthralgias. Negative for back pain and joint swelling.   Skin: Negative for rash.   Neurological: Negative for dizziness, numbness and headaches.   Hematological: Negative for adenopathy. Does not bruise/bleed easily.   Psychiatric/Behavioral: Negative for dysphoric mood. The patient is not nervous/anxious.        Past Medical History:   Diagnosis Date   • Abdominal pain    • Abdominal wall strain    • Acid reflux    • Acute sinusitis    • Acute upper respiratory infection    • Allergic rhinitis, seasonal    • Anemia     iron deficiency anemia, resolved   • Aneurysm of ascending aorta (CMS/HCC)    • Anxiety  disorder    • Aortic aneurysm (CMS/HCC)     MONITORED   • Arthritis    • Atrial fibrillation (CMS/HCC)    • Cataract    • Chronic periscapular pain on right side    • Chronic renal insufficiency    • Closed compression fracture of L1 vertebra (CMS/HCC)    • Colon distention    • Colon polyp    • Community acquired pneumonia    • Constipation    • Coronary artery disease    • Cough    • Cyst, bone    • Disease of thyroid gland    • DJD of shoulder    • Dyspnea    • Dysuria    • Edema    • Elevated brain natriuretic peptide (BNP) level    • Encounter for long-term (current) use of medications    • Esophageal reflux disease    • Fatigue    • Generalized abdominal pain    • Glaucoma    • Glenohumeral arthritis, right    • H/O gastroesophageal reflux (GERD)    • Hematuria    • History of iron deficiency anemia    • Hypercalcemia     Due to increased calcium intake in addition to calcium supplements   • Hypercholesterolemia    • Hyperkalemia    • Hyperlipidemia    • Hyperpigmentation of skin of cheek    • Hypertension    • Hyponatremia    • Hypothyroidism    • IgG monoclonal gammopathy     IgG kappa type   • Incisional hernia    • Influenza    • Irregular heart beat     PACEMAKER   • Itching in the vaginal area    • Low vitamin B12 level    • Lumbago    • Memory change    • Monitoring for long-term anticoagulant use    • Nonvenomous insect bite    • Osteoarthritis of left glenohumeral joint    • Osteoarthritis of right glenohumeral joint    • Osteopenia    • Osteoporosis    • Paroxysmal atrial fibrillation (CMS/HCC)    • Periscapular pain    • Persistent atrial fibrillation    • Rash    • Right shoulder pain    • Shortness of breath    • Sick sinus syndrome (CMS/HCC)    • Sinus bradycardia    • Status post placement of cardiac pacemaker    • Tricuspid regurgitation    • Urinary body odor    • Urinary frequency    • Urinary tract infection    • Urinary urgency    • Viral enteritis    • Viral gastroenteritis    • Vitamin D  deficiency    • Weakness    • Weakness of distal arms and legs        Past Surgical History:   Procedure Laterality Date   • ABDOMINAL SURGERY  09/18/2012    Pelvic abscess draining with appendectomy and resection and anastomosis of small intestine   • APPENDECTOMY  09/18/2012   • CARPAL TUNNEL RELEASE Bilateral    • CATARACT EXTRACTION     • CHOLECYSTECTOMY  08/31/2012   • COLON RESECTION SMALL BOWEL  09/2012    Removal of 1   foot of small bowel and abscess   • COLONOSCOPY N/A 4/13/2016    Procedure: COLONOSCOPY to cecum;  Surgeon: Pro Paredes MD;  Location: [facility] ENDOSCOPY;  Service:    • HYSTERECTOMY  1984   • INSERT / REPLACE / REMOVE PACEMAKER      insertion   • OOPHORECTOMY     • PACEMAKER IMPLANTATION     • TONSILLECTOMY         Cancer-related family history includes Cancer in her brother.  Social History     Tobacco Use   • Smoking status: Never Smoker   • Smokeless tobacco: Never Used   Substance Use Topics   • Alcohol use: No       MEDICATIONS:    Current Outpatient Medications:   •  acetaminophen (TYLENOL) 325 MG tablet, Take 325 mg by mouth 3 (Three) Times a Day As Needed., Disp: , Rfl:   •  apixaban (ELIQUIS) 5 MG tablet tablet, Take 5 mg by mouth 2 (Two) Times a Day., Disp: , Rfl:   •  atorvastatin (LIPITOR) 10 MG tablet, Take 10 mg by mouth daily., Disp: , Rfl:   •  bumetanide (BUMEX) 1 MG tablet, Take 0.5 mg by mouth Daily., Disp: , Rfl:   •  celecoxib (CeleBREX) 100 MG capsule, Take 100 mg by mouth 2 (Two) Times a Day. Takes 1/2 tablet twice a day, Disp: , Rfl:   •  Cetirizine HCl (ZYRTEC PO), Take  by mouth., Disp: , Rfl:   •  doxazosin (CARDURA) 2 MG tablet, Take 1 tablet by mouth Every Night., Disp: 90 tablet, Rfl: 0  •  famotidine (PEPCID) 40 MG tablet, , Disp: , Rfl:   •  fluticasone (FLONASE) 50 MCG/ACT nasal spray, 2 sprays into the nostril(s) as directed by provider Daily. Administer 2 sprays in each nostril for each dose., Disp: 48 mL, Rfl: 0  •  irbesartan (AVAPRO) 300 MG  "tablet, Take 1 tablet by mouth Daily., Disp: 90 tablet, Rfl: 0  •  levothyroxine (SYNTHROID, LEVOTHROID) 150 MCG tablet, Take 1 tablet by mouth Daily., Disp: 90 tablet, Rfl: 0  •  metoprolol succinate XL (TOPROL-XL) 100 MG 24 hr tablet, Take 150 mg by mouth Daily., Disp: , Rfl:   •  omeprazole (PriLOSEC) 20 MG capsule, Take 20 mg by mouth 2 (Two) Times a Day., Disp: , Rfl:   •  timolol (TIMOPTIC) 0.5 % ophthalmic solution, Administer 1 drop to both eyes Daily., Disp: , Rfl:   •  timolol (TIMOPTIC-XR) 0.5 % ophthalmic gel-forming, , Disp: , Rfl:   •  vitamin B-12 (CYANOCOBALAMIN) 1000 MCG tablet, Take 1 tablet by mouth Daily., Disp: , Rfl:     ALLERGIES:  Allergies   Allergen Reactions   • Penicillin V Potassium Swelling     Throat swelled   • Latex Itching         Objective   VITAL SIGNS:     Vitals:    10/16/19 1207   BP: 145/93   Pulse: 77   Resp: 20   Temp: 98.3 °F (36.8 °C)   TempSrc: Oral   SpO2: 98%   Weight: 70 kg (154 lb 6.4 oz)   Height: 167 cm (65.75\")   PainSc: 0-No pain     Body mass index is 25.11 kg/m².     Wt Readings from Last 3 Encounters:   10/16/19 70 kg (154 lb 6.4 oz)   08/27/19 70.2 kg (154 lb 11.2 oz)   03/26/19 73.7 kg (162 lb 6.4 oz)       PHYSICAL EXAMINATION:  GENERAL:  The patient appears in good general condition, not in acute distress.  SKIN: Warm and dry. No skin rashes. No ecchymosis.  HEAD:  Normocephalic.  EYES:  No Jaundice. No Pallor. Pupils equal. EOMI.  NECK:  Supple with Good ROM. No Masses.  CHEST: Normal respiratory effort. Few bibasal crepitations.   CARDIAC: Irregularly irregular. Normal S1 & S2. No edema.  EXTREMITIES:  No clubbing. Osteoarthritis in the hands. No Calf tenderness.  NEUROLOGICAL:  No Focal neurological deficits.       DIAGNOSTIC DATA:     Component      Latest Ref Rng & Units 10/1/2019   WBC      3.40 - 10.80 10*3/mm3 5.04   RBC      3.77 - 5.28 10*6/mm3 3.60 (L)   Hemoglobin      12.0 - 15.9 g/dL 11.3 (L)   Hematocrit      34.0 - 46.6 % 36.4   MCV      " 79.0 - 97.0 fL 101.1 (H)   MCH      26.6 - 33.0 pg 31.4   MCHC      31.5 - 35.7 g/dL 31.0 (L)   RDW      12.3 - 15.4 % 16.3 (H)   RDW-SD      37.0 - 54.0 fl 59.6 (H)   MPV      6.0 - 12.0 fL 9.0   Platelets      140 - 450 10*3/mm3 141   Neutrophil Rel %      42.7 - 76.0 % 51.8   Lymphocyte Rel %      19.6 - 45.3 % 32.5   Monocyte Rel %      5.0 - 12.0 % 14.1 (H)   Eosinophil Rel %      0.3 - 6.2 % 1.2   Basophil Rel %      0.0 - 1.5 % 0.2   Immature Granulocyte Rel %      0.0 - 0.5 % 0.2   Neutrophils Absolute      1.70 - 7.00 10*3/mm3 2.61   Lymphocytes Absolute      0.70 - 3.10 10*3/mm3 1.64   Monocytes Absolute      0.10 - 0.90 10*3/mm3 0.71   Eosinophils Absolute      0.00 - 0.40 10*3/mm3 0.06   Basophils Absolute      0.00 - 0.20 10*3/mm3 0.01   Immature Grans, Absolute      0.00 - 0.05 10*3/mm3 0.01   nRBC      0.0 - 0.2 /100 WBC 0.0   Glucose      74 - 124 mg/dL 78   BUN      6 - 20 mg/dL 30 (H)   Creatinine      0.60 - 1.10 mg/dL 1.09   Sodium      134 - 145 mmol/L 138   Potassium      3.5 - 4.7 mmol/L 3.5   Chloride      98 - 107 mmol/L 101   CO2      22.0 - 29.0 mmol/L 27.1   Calcium      8.5 - 10.2 mg/dL 10.4 (H)   Total Protein      6.3 - 8.0 g/dL 8.3 (H)   Albumin      3.50 - 5.20 g/dL 3.70   ALT (SGPT)      0 - 33 U/L 14   AST (SGOT)      0 - 32 U/L 18   Alkaline Phosphatase      38 - 116 U/L 68   Total Bilirubin      0.2 - 1.2 mg/dL 0.6   eGFR Non African Am      >60 mL/min/1.73 48 (L)   Globulin      1.8 - 3.5 gm/dL 4.6 (H)   A/G Ratio      1.1 - 2.4 g/dL 0.8 (L)   BUN/Creatinine Ratio      7.3 - 30.0 27.5   Anion Gap      5.0 - 15.0 mmol/L 9.9   Vitamin B-12      211 - 946 pg/mL 496   Uric Acid      2.8 - 7.4 mg/dL 8.4 (H)     Component      Latest Ref Rng & Units 10/2/2018 3/19/2019 8/15/2019 10/1/2019   IgG      700 - 1600 mg/dL 3151 (H) 3284 (H) 3212 (H) 3389 (H)   IgA      64 - 422 mg/dL 50 (L) 39 (L) 34 (L) 37 (L)   IgM      26 - 217 mg/dL 14 (L) 14 (L) 11 (L) 12 (L)   Total Protein      6.0 -  8.5 g/dL 7.9 8.1 7.5 7.7   Albumin      2.9 - 4.4 g/dL 3.9 3.6 3.4 3.5   Alpha-1-Globulin      0.0 - 0.4 g/dL 0.2 0.2 0.2 0.3   Alpha-2-Globulin      0.4 - 1.0 g/dL 0.6 0.7 0.6 0.6   Beta Globulin      0.7 - 1.3 g/dL 0.7 0.7 0.7 0.6 (L)   Gamma Globulin      0.4 - 1.8 g/dL 2.6 (H) 2.8 (H) 2.6 (H) 2.7 (H)   M-William      Not Observed g/dL 2.4 (H) 2.7 (H) 2.3 (H) 2.5 (H)   Globulin      2.2 - 3.9 g/dL 4.0 (H) 4.5 (H) 4.1 (H) 4.2 (H)   A/G Ratio      0.7 - 1.7 1.0 0.9 0.9 0.9   Immunofixation Reflex, Serum       Comment Comment Comment Comment   Please note       Comment Comment Comment Comment   Kappa FLC      3.3 - 19.4 mg/L 36.2 (H) 43.3 (H) 79.3 (H) 70.6 (H)   Free Lambda Light Chains      5.7 - 26.3 mg/L 8.8 7.5 10.2 10.5   Kappa/Lambda Ratio      0.26 - 1.65 4.11 (H) 5.77 (H) 7.77 (H) 6.72 (H)   Beta-2 Microglobulin      0.8 - 2.2 mg/L   11.6 (H) 12.8 (H)         SKELETAL BONE SURVEY 10/8/19:     HISTORY: Monoclonal gammopathy. Evaluate for lytic lesions.     FINDINGS: The bone survey consists of 17 images. No lytic lesions are  identified and there is no change from 09/16/2015. Again noted are  extremely severe degenerative changes of both shoulders and there is  slight chronic compression deformity of the L1 vertebral body.     CONCLUSION: Negative skeletal bone survey showing no change from  09/16/2015.     This report was finalized on 10/8/2019 5:41 PM by Dr. Zan Steinbock,  M.D.    Assessment/Plan   1.  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    · M protein increased to 2.7 on 3/19/2019.    · M protein decreased to 2.2 on 8/15/2019.    · M protein increased to 2.5 on 10/1/19.    B2M increased to 12.8 on 10/1/19. It is not clear if this is due to the decrease in the kidney function. Her calcium increased to 10.4 but she has been eating Activia yogurt twice daily and this may have increased her Ca in the blood.  M protein is considered to be stable. No lytic lesions seen in the  bone.     Overall, no signs of transformation to multiple myeloma.     2.  Anemia due to vitamin B12 deficiency and due to anemia of chronic kidney disease.  Hemoglobin is at 11.3.     3.  Acute kidney injury due to diuretics. Creatinine level is slightly better. Uric acid level is elevated and this can be seen with Bumex. She is not having symptoms of gout.       PLAN:    1. We will continue to monitor. We will repeat CBC, CMP, SPEP, JARED, FLC and B2M in 6 weeks and see her in follow up afterwards. If Calcium remains elevated or if B2M increases, we would recommend a bone marrow biopsy.     2.  We discussed the need to increase fluid intake and avoid NSAIDs. She is no longer taking Celebrex or Omeprazole.     3.  Continue B12 1000 mcg daily.       Hanna Santana MD  10/16/19

## 2019-10-22 ENCOUNTER — OFFICE VISIT (OUTPATIENT)
Dept: FAMILY MEDICINE CLINIC | Facility: CLINIC | Age: 84
End: 2019-10-22

## 2019-10-22 VITALS
DIASTOLIC BLOOD PRESSURE: 90 MMHG | HEART RATE: 74 BPM | OXYGEN SATURATION: 95 % | SYSTOLIC BLOOD PRESSURE: 142 MMHG | WEIGHT: 152 LBS | BODY MASS INDEX: 24.43 KG/M2 | HEIGHT: 66 IN

## 2019-10-22 DIAGNOSIS — Z95.0 NORMALLY FUNCTIONING CARDIAC PACEMAKER PRESENT: ICD-10-CM

## 2019-10-22 DIAGNOSIS — J90 PLEURAL EFFUSION: Primary | ICD-10-CM

## 2019-10-22 DIAGNOSIS — M67.40 GANGLION CYST: ICD-10-CM

## 2019-10-22 DIAGNOSIS — E03.9 ACQUIRED HYPOTHYROIDISM: ICD-10-CM

## 2019-10-22 DIAGNOSIS — I48.19 OTHER PERSISTENT ATRIAL FIBRILLATION (HCC): ICD-10-CM

## 2019-10-22 DIAGNOSIS — I10 ESSENTIAL HYPERTENSION: ICD-10-CM

## 2019-10-22 DIAGNOSIS — I07.1 TRICUSPID VALVE INSUFFICIENCY, UNSPECIFIED ETIOLOGY: ICD-10-CM

## 2019-10-22 PROCEDURE — 99214 OFFICE O/P EST MOD 30 MIN: CPT | Performed by: FAMILY MEDICINE

## 2019-10-22 NOTE — PROGRESS NOTES
Subjective   Isabell Angel is a 84 y.o. female.     Chief Complaint   Patient presents with   • Cyst     To her left wrist, started about a month ago   • Cough     States the MD at [facility] told her he could hear some fluid in her lungs, wants to ask about a pulmonologist.    • Labs Only        History of Present Illness     Patient here to follow-up.  She said the oncologist told her he heard some fluid in her lungs the other day.  She does not feel nearly like she did back in the spring when she had a massive pleural effusion.  When she went to the ER over the summer was kept for hypotension there was not any fluid on her chest x-ray.  Since then she backed off on the bumetanide and is just taking half a tablet once a day.  She is up-to-date with cardiology follow-up.  Her labs were reviewed from the oncologist.    Patient is also concerned about a mass and swelling on her palmar side of her left wrist.  She has not had any injury and it does not hurt.    The following portions of the patient's history were reviewed and updated as appropriate: allergies, current medications, past family history, past medical history, past social history, past surgical history and problem list.    Past Medical History:   Diagnosis Date   • Abdominal pain    • Abdominal wall strain    • Acid reflux    • Acute sinusitis    • Acute upper respiratory infection    • Allergic rhinitis, seasonal    • Anemia     iron deficiency anemia, resolved   • Aneurysm of ascending aorta (CMS/HCC)    • Anxiety disorder    • Aortic aneurysm (CMS/HCC)     MONITORED   • Arthritis    • Atrial fibrillation (CMS/HCC)    • Cataract    • Chronic periscapular pain on right side    • Chronic renal insufficiency    • Closed compression fracture of L1 vertebra (CMS/HCC)    • Colon distention    • Colon polyp    • Community acquired pneumonia    • Constipation    • Coronary artery disease    • Cough    • Cyst, bone    • Disease of thyroid gland    • DJD of shoulder     • Dyspnea    • Dysuria    • Edema    • Elevated brain natriuretic peptide (BNP) level    • Encounter for long-term (current) use of medications    • Esophageal reflux disease    • Fatigue    • Generalized abdominal pain    • Glaucoma    • Glenohumeral arthritis, right    • H/O gastroesophageal reflux (GERD)    • Hematuria    • History of iron deficiency anemia    • Hypercalcemia     Due to increased calcium intake in addition to calcium supplements   • Hypercholesterolemia    • Hyperkalemia    • Hyperlipidemia    • Hyperpigmentation of skin of cheek    • Hypertension    • Hyponatremia    • Hypothyroidism    • IgG monoclonal gammopathy     IgG kappa type   • Incisional hernia    • Influenza    • Irregular heart beat     PACEMAKER   • Itching in the vaginal area    • Low vitamin B12 level    • Lumbago    • Memory change    • Monitoring for long-term anticoagulant use    • Nonvenomous insect bite    • Osteoarthritis of left glenohumeral joint    • Osteoarthritis of right glenohumeral joint    • Osteopenia    • Osteoporosis    • Paroxysmal atrial fibrillation (CMS/HCC)    • Periscapular pain    • Persistent atrial fibrillation    • Rash    • Right shoulder pain    • Shortness of breath    • Sick sinus syndrome (CMS/HCC)    • Sinus bradycardia    • Status post placement of cardiac pacemaker    • Tricuspid regurgitation    • Urinary body odor    • Urinary frequency    • Urinary tract infection    • Urinary urgency    • Viral enteritis    • Viral gastroenteritis    • Vitamin D deficiency    • Weakness    • Weakness of distal arms and legs        Past Surgical History:   Procedure Laterality Date   • ABDOMINAL SURGERY  09/18/2012    Pelvic abscess draining with appendectomy and resection and anastomosis of small intestine   • APPENDECTOMY  09/18/2012   • CARPAL TUNNEL RELEASE Bilateral    • CATARACT EXTRACTION     • CHOLECYSTECTOMY  08/31/2012   • COLON RESECTION SMALL BOWEL  09/2012    Removal of 1   foot of small bowel  and abscess   • COLONOSCOPY N/A 4/13/2016    Procedure: COLONOSCOPY to cecum;  Surgeon: Pro Paredes MD;  Location: [facility] ENDOSCOPY;  Service:    • HYSTERECTOMY  1984   • INSERT / REPLACE / REMOVE PACEMAKER      insertion   • OOPHORECTOMY     • PACEMAKER IMPLANTATION     • TONSILLECTOMY         Family History   Problem Relation Age of Onset   • Cancer Brother    • Hypertension Mother    • Heart disease Father    • Hypertension Sister    • Arthritis Other    • Macular degeneration Other    • Heart disease Other    • Other Other         colitis       Social History     Socioeconomic History   • Marital status:      Spouse name: Not on file   • Number of children: Not on file   • Years of education: Not on file   • Highest education level: Not on file   Occupational History     Employer: RETIRED   Tobacco Use   • Smoking status: Never Smoker   • Smokeless tobacco: Never Used   Substance and Sexual Activity   • Alcohol use: No   • Drug use: No   • Sexual activity: Defer   Social History Narrative    The patient is a . She is a retired . She does not smoke or drink.         Current Outpatient Medications:   •  acetaminophen (TYLENOL) 325 MG tablet, Take 325 mg by mouth 3 (Three) Times a Day As Needed., Disp: , Rfl:   •  apixaban (ELIQUIS) 5 MG tablet tablet, Take 5 mg by mouth 2 (Two) Times a Day., Disp: , Rfl:   •  atorvastatin (LIPITOR) 10 MG tablet, Take 10 mg by mouth daily., Disp: , Rfl:   •  bumetanide (BUMEX) 1 MG tablet, Take 0.5 mg by mouth Daily., Disp: , Rfl:   •  Cetirizine HCl (ZYRTEC PO), Take  by mouth., Disp: , Rfl:   •  doxazosin (CARDURA) 2 MG tablet, Take 1 tablet by mouth Every Night., Disp: 90 tablet, Rfl: 0  •  famotidine (PEPCID) 40 MG tablet, , Disp: , Rfl:   •  fluticasone (FLONASE) 50 MCG/ACT nasal spray, 2 sprays into the nostril(s) as directed by provider Daily. Administer 2 sprays in each nostril for each dose., Disp: 48 mL, Rfl: 0  •  irbesartan (AVAPRO)  "300 MG tablet, Take 1 tablet by mouth Daily., Disp: 90 tablet, Rfl: 0  •  levothyroxine (SYNTHROID, LEVOTHROID) 150 MCG tablet, Take 1 tablet by mouth Daily., Disp: 90 tablet, Rfl: 0  •  metoprolol succinate XL (TOPROL-XL) 100 MG 24 hr tablet, Take 150 mg by mouth Daily., Disp: , Rfl:   •  timolol (TIMOPTIC) 0.5 % ophthalmic solution, Administer 1 drop to both eyes Daily., Disp: , Rfl:   •  timolol (TIMOPTIC-XR) 0.5 % ophthalmic gel-forming, , Disp: , Rfl:   •  vitamin B-12 (CYANOCOBALAMIN) 1000 MCG tablet, Take 1 tablet by mouth Daily., Disp: , Rfl:     Review of Systems   Constitutional: Negative for chills, fatigue and fever.   HENT: Negative for congestion, rhinorrhea and sore throat.    Respiratory: Positive for cough. Negative for shortness of breath.    Cardiovascular: Negative for chest pain and leg swelling.   Gastrointestinal: Negative for abdominal pain.   Endocrine: Negative for polydipsia and polyuria.   Genitourinary: Negative for dysuria.   Musculoskeletal: Negative for arthralgias and myalgias.   Skin: Negative for rash.   Neurological: Negative for dizziness.   Hematological: Does not bruise/bleed easily.   Psychiatric/Behavioral: Negative for sleep disturbance.       Objective   Vitals:    10/22/19 1121   BP: 142/90   Pulse: 74   SpO2: 95%   Weight: 68.9 kg (152 lb)   Height: 167 cm (65.75\")     Body mass index is 24.72 kg/m².  Physical Exam   Constitutional: She is oriented to person, place, and time. She appears well-developed and well-nourished.   HENT:   Head: Normocephalic and atraumatic.   Eyes: Conjunctivae and EOM are normal. Pupils are equal, round, and reactive to light.   Neck: Neck supple. No thyromegaly present.   Cardiovascular: Normal rate. An irregularly irregular rhythm present.   Murmur heard.  Pulmonary/Chest: Effort normal. She has decreased breath sounds. She has no wheezes. She has no rhonchi. She has rales in the right lower field and the left lower field.   Abdominal: Soft. "   Musculoskeletal: Normal range of motion.        Arms:  Neurological: She is alert and oriented to person, place, and time. No cranial nerve deficit.   Skin: Skin is warm and dry.   Psychiatric: She has a normal mood and affect.         Assessment/Plan   Isabell was seen today for cyst, cough and labs only.    Diagnoses and all orders for this visit:    Pleural effusion  -     XR Chest 2 View; Future    Ganglion cyst    Other persistent atrial fibrillation    Essential hypertension    Tricuspid valve insufficiency, unspecified etiology    Acquired hypothyroidism    Normally functioning cardiac pacemaker present               Patient Instructions   Labs due for hematology/oncology the last week of November.   Follow up pending results of xrays.

## 2019-10-22 NOTE — PATIENT INSTRUCTIONS
Labs due for hematology/oncology the last week of November.   Follow up pending results of xrays.

## 2019-10-24 DIAGNOSIS — J90 PLEURAL EFFUSION: ICD-10-CM

## 2019-11-11 RX ORDER — IRBESARTAN 300 MG/1
TABLET ORAL
Qty: 90 TABLET | Refills: 0 | Status: SHIPPED | OUTPATIENT
Start: 2019-11-11 | End: 2020-02-18 | Stop reason: SDUPTHER

## 2019-11-18 ENCOUNTER — LAB (OUTPATIENT)
Dept: LAB | Facility: HOSPITAL | Age: 84
End: 2019-11-18

## 2019-11-18 DIAGNOSIS — D47.2 IGG MONOCLONAL GAMMOPATHY: ICD-10-CM

## 2019-11-18 DIAGNOSIS — E79.0 HYPERURICEMIA: ICD-10-CM

## 2019-11-18 DIAGNOSIS — D51.9 ANEMIA DUE TO VITAMIN B12 DEFICIENCY, UNSPECIFIED B12 DEFICIENCY TYPE: ICD-10-CM

## 2019-11-18 DIAGNOSIS — N17.9 ACUTE KIDNEY INJURY (HCC): ICD-10-CM

## 2019-11-18 LAB
ALBUMIN SERPL-MCNC: 3.8 G/DL (ref 3.5–5.2)
ALBUMIN/GLOB SERPL: 0.8 G/DL (ref 1.1–2.4)
ALP SERPL-CCNC: 57 U/L (ref 38–116)
ALT SERPL W P-5'-P-CCNC: 16 U/L (ref 0–33)
ANION GAP SERPL CALCULATED.3IONS-SCNC: 11.1 MMOL/L (ref 5–15)
AST SERPL-CCNC: 20 U/L (ref 0–32)
B2 MICROGLOB SERPL-MCNC: 12 MG/L (ref 0.8–2.2)
BASOPHILS # BLD AUTO: 0.01 10*3/MM3 (ref 0–0.2)
BASOPHILS NFR BLD AUTO: 0.2 % (ref 0–1.5)
BILIRUB SERPL-MCNC: 0.6 MG/DL (ref 0.2–1.2)
BUN BLD-MCNC: 28 MG/DL (ref 6–20)
BUN/CREAT SERPL: 25.9 (ref 7.3–30)
CALCIUM SPEC-SCNC: 10.3 MG/DL (ref 8.5–10.2)
CHLORIDE SERPL-SCNC: 101 MMOL/L (ref 98–107)
CO2 SERPL-SCNC: 27.9 MMOL/L (ref 22–29)
CREAT BLD-MCNC: 1.08 MG/DL (ref 0.6–1.1)
DEPRECATED RDW RBC AUTO: 54.8 FL (ref 37–54)
EOSINOPHIL # BLD AUTO: 0.06 10*3/MM3 (ref 0–0.4)
EOSINOPHIL NFR BLD AUTO: 1.2 % (ref 0.3–6.2)
ERYTHROCYTE [DISTWIDTH] IN BLOOD BY AUTOMATED COUNT: 14.5 % (ref 12.3–15.4)
GFR SERPL CREATININE-BSD FRML MDRD: 48 ML/MIN/1.73
GLOBULIN UR ELPH-MCNC: 4.9 GM/DL (ref 1.8–3.5)
GLUCOSE BLD-MCNC: 85 MG/DL (ref 74–124)
HCT VFR BLD AUTO: 37.7 % (ref 34–46.6)
HGB BLD-MCNC: 12 G/DL (ref 12–15.9)
IMM GRANULOCYTES # BLD AUTO: 0.01 10*3/MM3 (ref 0–0.05)
IMM GRANULOCYTES NFR BLD AUTO: 0.2 % (ref 0–0.5)
LYMPHOCYTES # BLD AUTO: 2.01 10*3/MM3 (ref 0.7–3.1)
LYMPHOCYTES NFR BLD AUTO: 41.1 % (ref 19.6–45.3)
MCH RBC QN AUTO: 32.8 PG (ref 26.6–33)
MCHC RBC AUTO-ENTMCNC: 31.8 G/DL (ref 31.5–35.7)
MCV RBC AUTO: 103 FL (ref 79–97)
MONOCYTES # BLD AUTO: 0.69 10*3/MM3 (ref 0.1–0.9)
MONOCYTES NFR BLD AUTO: 14.1 % (ref 5–12)
NEUTROPHILS # BLD AUTO: 2.11 10*3/MM3 (ref 1.7–7)
NEUTROPHILS NFR BLD AUTO: 43.2 % (ref 42.7–76)
NRBC BLD AUTO-RTO: 0 /100 WBC (ref 0–0.2)
PLATELET # BLD AUTO: 126 10*3/MM3 (ref 140–450)
PMV BLD AUTO: 9.3 FL (ref 6–12)
POTASSIUM BLD-SCNC: 3.7 MMOL/L (ref 3.5–4.7)
PROT SERPL-MCNC: 8.7 G/DL (ref 6.3–8)
RBC # BLD AUTO: 3.66 10*6/MM3 (ref 3.77–5.28)
SODIUM BLD-SCNC: 140 MMOL/L (ref 134–145)
URATE SERPL-MCNC: 8.9 MG/DL (ref 2.8–7.4)
WBC NRBC COR # BLD: 4.89 10*3/MM3 (ref 3.4–10.8)

## 2019-11-18 PROCEDURE — 80053 COMPREHEN METABOLIC PANEL: CPT

## 2019-11-18 PROCEDURE — 82232 ASSAY OF BETA-2 PROTEIN: CPT | Performed by: INTERNAL MEDICINE

## 2019-11-18 PROCEDURE — 85025 COMPLETE CBC W/AUTO DIFF WBC: CPT

## 2019-11-18 PROCEDURE — 36415 COLL VENOUS BLD VENIPUNCTURE: CPT

## 2019-11-18 PROCEDURE — 84550 ASSAY OF BLOOD/URIC ACID: CPT

## 2019-11-19 LAB
ALBUMIN SERPL-MCNC: 3.6 G/DL (ref 2.9–4.4)
ALBUMIN/GLOB SERPL: 0.9 {RATIO} (ref 0.7–1.7)
ALPHA1 GLOB FLD ELPH-MCNC: 0.3 G/DL (ref 0–0.4)
ALPHA2 GLOB SERPL ELPH-MCNC: 0.7 G/DL (ref 0.4–1)
B-GLOBULIN SERPL ELPH-MCNC: 0.7 G/DL (ref 0.7–1.3)
GAMMA GLOB SERPL ELPH-MCNC: 2.9 G/DL (ref 0.4–1.8)
GLOBULIN SER CALC-MCNC: 4.5 G/DL (ref 2.2–3.9)
IGA SERPL-MCNC: 35 MG/DL (ref 64–422)
IGG SERPL-MCNC: 3788 MG/DL (ref 700–1600)
IGM SERPL-MCNC: 14 MG/DL (ref 26–217)
INTERPRETATION SERPL IEP-IMP: ABNORMAL
KAPPA LC SERPL-MCNC: 72.6 MG/L (ref 3.3–19.4)
KAPPA LC/LAMBDA SER: 6.98 {RATIO} (ref 0.26–1.65)
LAMBDA LC FREE SERPL-MCNC: 10.4 MG/L (ref 5.7–26.3)
Lab: ABNORMAL
M-SPIKE: 2.8 G/DL
PROT SERPL-MCNC: 8.1 G/DL (ref 6–8.5)

## 2019-11-19 RX ORDER — METOPROLOL SUCCINATE 100 MG/1
150 TABLET, EXTENDED RELEASE ORAL DAILY
Qty: 135 TABLET | Refills: 0 | Status: SHIPPED | OUTPATIENT
Start: 2019-11-19 | End: 2020-03-11 | Stop reason: SDUPTHER

## 2019-11-21 ENCOUNTER — APPOINTMENT (OUTPATIENT)
Dept: LAB | Facility: HOSPITAL | Age: 84
End: 2019-11-21

## 2019-12-02 RX ORDER — FAMOTIDINE 40 MG/1
TABLET, FILM COATED ORAL
Qty: 180 TABLET | Refills: 2 | Status: SHIPPED | OUTPATIENT
Start: 2019-12-02 | End: 2020-03-09 | Stop reason: SDUPTHER

## 2019-12-02 RX ORDER — LEVOTHYROXINE SODIUM 0.15 MG/1
TABLET ORAL
Qty: 84 TABLET | Refills: 0 | Status: SHIPPED | OUTPATIENT
Start: 2019-12-02 | End: 2020-03-05 | Stop reason: SDUPTHER

## 2019-12-05 ENCOUNTER — APPOINTMENT (OUTPATIENT)
Dept: LAB | Facility: HOSPITAL | Age: 84
End: 2019-12-05

## 2019-12-05 ENCOUNTER — OFFICE VISIT (OUTPATIENT)
Dept: ONCOLOGY | Facility: CLINIC | Age: 84
End: 2019-12-05

## 2019-12-05 VITALS
WEIGHT: 152.8 LBS | HEIGHT: 66 IN | BODY MASS INDEX: 24.56 KG/M2 | HEART RATE: 72 BPM | SYSTOLIC BLOOD PRESSURE: 153 MMHG | TEMPERATURE: 97.8 F | DIASTOLIC BLOOD PRESSURE: 88 MMHG | RESPIRATION RATE: 16 BRPM | OXYGEN SATURATION: 97 %

## 2019-12-05 DIAGNOSIS — N17.9 ACUTE KIDNEY INJURY (HCC): ICD-10-CM

## 2019-12-05 DIAGNOSIS — E53.8 B12 DEFICIENCY: ICD-10-CM

## 2019-12-05 DIAGNOSIS — D47.2 IGG MONOCLONAL GAMMOPATHY: Primary | ICD-10-CM

## 2019-12-05 DIAGNOSIS — D51.9 ANEMIA DUE TO VITAMIN B12 DEFICIENCY, UNSPECIFIED B12 DEFICIENCY TYPE: ICD-10-CM

## 2019-12-05 PROCEDURE — 99214 OFFICE O/P EST MOD 30 MIN: CPT | Performed by: INTERNAL MEDICINE

## 2019-12-05 PROCEDURE — G0463 HOSPITAL OUTPT CLINIC VISIT: HCPCS | Performed by: INTERNAL MEDICINE

## 2019-12-05 NOTE — PROGRESS NOTES
Subjective     CHIEF COMPLAINT:      Chief Complaint   Patient presents with   • Follow-up     discuss bumex       HISTORY OF PRESENT ILLNESS:     Isabell Angel is a 85 y.o. female patient who returns today for follow up on her monoclonal gammopathy.  She returns today for follow-up reporting fatigue and confusion.  She is not experiencing new areas of pain.  She continues Bumex 0.5 mg daily and she is concerned that this may be responsible for her symptoms.    Patient is not taking any calcium supplements.  She does not drink milk or eat dairy products on a regular basis.    REVIEW OF SYSTEMS:  Review of Systems   Constitutional: Positive for fatigue. Negative for chills, fever and unexpected weight change.   HENT: Negative for mouth sores, nosebleeds, sore throat and voice change.    Eyes: Negative for visual disturbance.   Respiratory: Positive for shortness of breath. Negative for cough.    Cardiovascular: Negative for chest pain and leg swelling.   Gastrointestinal: Negative for abdominal pain, blood in stool, constipation, diarrhea, nausea and vomiting.   Genitourinary: Negative for dysuria, frequency and hematuria.   Musculoskeletal: Negative for arthralgias, back pain and joint swelling.   Skin: Negative for rash.   Neurological: Positive for weakness. Negative for dizziness, numbness and headaches.   Hematological: Negative for adenopathy. Does not bruise/bleed easily.   Psychiatric/Behavioral: Negative for dysphoric mood. The patient is not nervous/anxious.      I verified the ROS obtained by the MA.      Past Medical History:   Diagnosis Date   • Abdominal pain    • Abdominal wall strain    • Acid reflux    • Acute sinusitis    • Acute upper respiratory infection    • Allergic rhinitis, seasonal    • Anemia     iron deficiency anemia, resolved   • Aneurysm of ascending aorta (CMS/HCC)    • Anxiety disorder    • Aortic aneurysm (CMS/HCC)     MONITORED   • Arthritis    • Atrial fibrillation (CMS/HCC)     • Cataract    • Chronic periscapular pain on right side    • Chronic renal insufficiency    • Closed compression fracture of L1 vertebra (CMS/HCC)    • Colon distention    • Colon polyp    • Community acquired pneumonia    • Constipation    • Coronary artery disease    • Cough    • Cyst, bone    • Disease of thyroid gland    • DJD of shoulder    • Dyspnea    • Dysuria    • Edema    • Elevated brain natriuretic peptide (BNP) level    • Encounter for long-term (current) use of medications    • Esophageal reflux disease    • Fatigue    • Generalized abdominal pain    • Glaucoma    • Glenohumeral arthritis, right    • H/O gastroesophageal reflux (GERD)    • Hematuria    • History of iron deficiency anemia    • Hypercalcemia     Due to increased calcium intake in addition to calcium supplements   • Hypercholesterolemia    • Hyperkalemia    • Hyperlipidemia    • Hyperpigmentation of skin of cheek    • Hypertension    • Hyponatremia    • Hypothyroidism    • IgG monoclonal gammopathy     IgG kappa type   • Incisional hernia    • Influenza    • Irregular heart beat     PACEMAKER   • Itching in the vaginal area    • Low vitamin B12 level    • Lumbago    • Memory change    • Monitoring for long-term anticoagulant use    • Nonvenomous insect bite    • Osteoarthritis of left glenohumeral joint    • Osteoarthritis of right glenohumeral joint    • Osteopenia    • Osteoporosis    • Paroxysmal atrial fibrillation (CMS/HCC)    • Periscapular pain    • Persistent atrial fibrillation    • Rash    • Right shoulder pain    • Shortness of breath    • Sick sinus syndrome (CMS/HCC)    • Sinus bradycardia    • Status post placement of cardiac pacemaker    • Tricuspid regurgitation    • Urinary body odor    • Urinary frequency    • Urinary tract infection    • Urinary urgency    • Viral enteritis    • Viral gastroenteritis    • Vitamin D deficiency    • Weakness    • Weakness of distal arms and legs        Past Surgical History:   Procedure  Laterality Date   • ABDOMINAL SURGERY  09/18/2012    Pelvic abscess draining with appendectomy and resection and anastomosis of small intestine   • APPENDECTOMY  09/18/2012   • CARPAL TUNNEL RELEASE Bilateral    • CATARACT EXTRACTION     • CHOLECYSTECTOMY  08/31/2012   • COLON RESECTION SMALL BOWEL  09/2012    Removal of 1   foot of small bowel and abscess   • COLONOSCOPY N/A 4/13/2016    Procedure: COLONOSCOPY to cecum;  Surgeon: Pro Paredes MD;  Location: [facility] ENDOSCOPY;  Service:    • HYSTERECTOMY  1984   • INSERT / REPLACE / REMOVE PACEMAKER      insertion   • OOPHORECTOMY     • PACEMAKER IMPLANTATION     • TONSILLECTOMY         Cancer-related family history includes Cancer in her brother.  Social History     Tobacco Use   • Smoking status: Never Smoker   • Smokeless tobacco: Never Used   Substance Use Topics   • Alcohol use: No       MEDICATIONS:    Current Outpatient Medications:   •  apixaban (ELIQUIS) 5 MG tablet tablet, Take 5 mg by mouth 2 (Two) Times a Day., Disp: , Rfl:   •  atorvastatin (LIPITOR) 10 MG tablet, Take 10 mg by mouth daily., Disp: , Rfl:   •  bumetanide (BUMEX) 1 MG tablet, Take 0.5 mg by mouth Daily., Disp: , Rfl:   •  doxazosin (CARDURA) 2 MG tablet, Take 1 tablet by mouth Every Night., Disp: 90 tablet, Rfl: 0  •  famotidine (PEPCID) 40 MG tablet, TAKE ONE TABLET BY MOUTH TWICE A DAY FOR STOMACH, Disp: 180 tablet, Rfl: 2  •  fluticasone (FLONASE) 50 MCG/ACT nasal spray, 2 sprays into the nostril(s) as directed by provider Daily. Administer 2 sprays in each nostril for each dose., Disp: 48 mL, Rfl: 0  •  irbesartan (AVAPRO) 300 MG tablet, TAKE ONE TABLET BY MOUTH DAILY, Disp: 90 tablet, Rfl: 0  •  levothyroxine (SYNTHROID, LEVOTHROID) 150 MCG tablet, TAKE ONE TABLET BY MOUTH DAILY AND TAKE ONE-HALF TABLET BY MOUTH ON MONDAYS, Disp: 84 tablet, Rfl: 0  •  metoprolol succinate XL (TOPROL-XL) 100 MG 24 hr tablet, Take 1.5 tablets by mouth Daily., Disp: 135 tablet, Rfl: 0  •   "timolol (TIMOPTIC) 0.5 % ophthalmic solution, Administer 1 drop to both eyes Daily., Disp: , Rfl:   •  vitamin B-12 (CYANOCOBALAMIN) 1000 MCG tablet, Take 1 tablet by mouth Daily., Disp: , Rfl:     ALLERGIES:  Allergies   Allergen Reactions   • Penicillin V Potassium Swelling     Throat swelled   • Latex Itching         Objective   VITAL SIGNS:     Vitals:    12/05/19 1133   BP: 153/88   Pulse: 72   Resp: 16   Temp: 97.8 °F (36.6 °C)   TempSrc: Oral   SpO2: 97%   Weight: 69.3 kg (152 lb 12.8 oz)   Height: 167 cm (65.75\")   PainSc: 0-No pain     Body mass index is 24.85 kg/m².     Wt Readings from Last 3 Encounters:   12/05/19 69.3 kg (152 lb 12.8 oz)   10/22/19 68.9 kg (152 lb)   10/16/19 70 kg (154 lb 6.4 oz)       PHYSICAL EXAMINATION:  GENERAL:  The patient appears in fair general condition, not in acute distress.  SKIN: Warm and dry. No skin rashes. No ecchymosis.  HEAD:  Normocephalic.  EYES:  No Jaundice. No Pallor. Pupils equal. EOMI.  NECK:  Supple with Good ROM.   CHEST: Normal respiratory effort. Lungs clear to auscultation.   CARDIAC:  Normal S1 & S2. No murmur. No edema.  ABDOMEN: Nondistended  EXTREMITIES:  No clubbing. No deforming arthritis in the hands. No Calf tenderness.  NEUROLOGICAL:  No Focal neurological deficits.       DIAGNOSTIC DATA:     Component      Latest Ref Rng & Units 8/15/2019 10/1/2019 11/18/2019   WBC      3.40 - 10.80 10*3/mm3 5.20 5.04 4.89   RBC      3.77 - 5.28 10*6/mm3 3.68 (L) 3.60 (L) 3.66 (L)   Hemoglobin      12.0 - 15.9 g/dL 11.5 (L) 11.3 (L) 12.0   Hematocrit      34.0 - 46.6 % 37.4 36.4 37.7   MCV      79.0 - 97.0 fL 101.6 (H) 101.1 (H) 103.0 (H)   MCH      26.6 - 33.0 pg 31.3 31.4 32.8   MCHC      31.5 - 35.7 g/dL 30.7 (L) 31.0 (L) 31.8   RDW      12.3 - 15.4 % 15.1 16.3 (H) 14.5   RDW-SD      37.0 - 54.0 fl 57.0 (H) 59.6 (H) 54.8 (H)   MPV      6.0 - 12.0 fL 10.4 9.0 9.3   Platelets      140 - 450 10*3/mm3 130 (L) 141 126 (L)   Neutrophil Rel %      42.7 - 76.0 % 42.1 " (L) 51.8 43.2   Lymphocyte Rel %      19.6 - 45.3 % 44.2 32.5 41.1   Monocyte Rel %      5.0 - 12.0 % 12.5 (H) 14.1 (H) 14.1 (H)   Eosinophil Rel %      0.3 - 6.2 % 0.8 1.2 1.2   Basophil Rel %      0.0 - 1.5 % 0.2 0.2 0.2   Immature Granulocyte Rel %      0.0 - 0.5 % 0.2 0.2 0.2   Neutrophils Absolute      1.70 - 7.00 10*3/mm3 2.19 2.61 2.11   Lymphocytes Absolute      0.70 - 3.10 10*3/mm3 2.30 1.64 2.01   Monocytes Absolute      0.10 - 0.90 10*3/mm3 0.65 0.71 0.69   Eosinophils Absolute      0.00 - 0.40 10*3/mm3 0.04 0.06 0.06   Basophils Absolute      0.00 - 0.20 10*3/mm3 0.01 0.01 0.01   Immature Grans, Absolute      0.00 - 0.05 10*3/mm3 0.01 0.01 0.01   nRBC      0.0 - 0.2 /100 WBC 0.0 0.0 0.0     Component      Latest Ref Rng & Units 8/15/2019 10/1/2019 11/18/2019   Glucose      74 - 124 mg/dL 93 78 85   BUN      6 - 20 mg/dL 26 (H) 30 (H) 28 (H)   Creatinine      0.60 - 1.10 mg/dL 1.18 (H) 1.09 1.08   Sodium      134 - 145 mmol/L 138 138 140   Potassium      3.5 - 4.7 mmol/L 4.5 3.5 3.7   Chloride      98 - 107 mmol/L 101 101 101   CO2      22.0 - 29.0 mmol/L 26.5 27.1 27.9   Calcium      8.5 - 10.2 mg/dL 9.7 10.4 (H) 10.3 (H)   Total Protein      6.3 - 8.0 g/dL 8.0 8.3 (H) 8.7 (H)   Albumin      3.50 - 5.20 g/dL 3.70 3.70 3.80   ALT (SGPT)      0 - 33 U/L 11 14 16   AST (SGOT)      0 - 32 U/L 19 18 20   Alkaline Phosphatase      38 - 116 U/L 51 68 57   Total Bilirubin      0.2 - 1.2 mg/dL 0.5 0.6 0.6   eGFR Non African Am      >60 mL/min/1.73 44 (L) 48 (L) 48 (L)   Globulin      1.8 - 3.5 gm/dL 4.3 (H) 4.6 (H) 4.9 (H)   A/G Ratio      1.1 - 2.4 g/dL 0.9 (L) 0.8 (L) 0.8 (L)   BUN/Creatinine Ratio      7.3 - 30.0 22.0 27.5 25.9   Anion Gap      5.0 - 15.0 mmol/L 10.5 9.9 11.1       Component      Latest Ref Rng & Units 8/15/2019 10/1/2019 11/18/2019   IgG      700 - 1600 mg/dL 3212 (H) 3389 (H) 3788 (H)   IgA      64 - 422 mg/dL 34 (L) 37 (L) 35 (L)   IgM      26 - 217 mg/dL 11 (L) 12 (L) 14 (L)   Total  Protein      6.0 - 8.5 g/dL 7.5 7.7 8.1   Albumin      2.9 - 4.4 g/dL 3.4 3.5 3.6   Alpha-1-Globulin      0.0 - 0.4 g/dL 0.2 0.3 0.3   Alpha-2-Globulin      0.4 - 1.0 g/dL 0.6 0.6 0.7   Beta Globulin      0.7 - 1.3 g/dL 0.7 0.6 (L) 0.7   Gamma Globulin      0.4 - 1.8 g/dL 2.6 (H) 2.7 (H) 2.9 (H)   M-Willaim      Not Observed g/dL 2.3 (H) 2.5 (H) 2.8 (H)   Globulin      2.2 - 3.9 g/dL 4.1 (H) 4.2 (H) 4.5 (H)   A/G Ratio      0.7 - 1.7 0.9 0.9 0.9   Immunofixation Reflex, Serum       Comment Comment Comment   Please note       Comment Comment Comment   Kappa FLC      3.3 - 19.4 mg/L 79.3 (H) 70.6 (H) 72.6 (H)   Free Lambda Light Chains      5.7 - 26.3 mg/L 10.2 10.5 10.4   Kappa/Lambda Ratio      0.26 - 1.65 7.77 (H) 6.72 (H) 6.98 (H)   Beta-2 Microglobulin      0.8 - 2.2 mg/L 11.6 (H) 12.8 (H) 12.0 (H)         Assessment/Plan   1.  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    · M protein increased to 2.7 on 3/19/2019.    · M protein decreased to 2.2 on 8/15/2019.    · M protein increased to 2.5 on 10/1/19.  · M protein increased to 2.8 on 11/18/2019  · Skeletal survey on 10/8/2019 showed no lytic lesions.  · B2M was 12 on 11/18/2019 and was down from 12.8 on 10/1/2019.  · Patient's kidney function improved compared to August 2019.  However, she continues to have hypercalcemia.  I told the patient I am concerned that this may represent progression to active multiple myeloma.  I recommended proceeding with bone marrow biopsy.  However, the patient wanted to wait until she has her lung and heart issues taken care of.    2.  Anemia due to vitamin B12 deficiency.  She is on vitamin B12 1000 mcg daily.    3.  Acute kidney injury due to diuretics.  Creatinine is currently at the upper end of normal.  Creatinine clearance is 41.7.      PLAN:    1.  Since the patient does not want to proceed with bone marrow biopsy at this point, we will repeat labs in early January 2020.  2.  We discussed again  reducing intake of calcium rich food.  3.  I will see her in follow-up in January 2020 with CBC CMP SPEP JARED B2M 1 week prior.      Hanna Santana MD  12/05/19

## 2019-12-10 RX ORDER — ATORVASTATIN CALCIUM 10 MG/1
TABLET, FILM COATED ORAL
Qty: 90 TABLET | Refills: 0 | Status: SHIPPED | OUTPATIENT
Start: 2019-12-10 | End: 2020-03-09 | Stop reason: SDUPTHER

## 2019-12-26 RX ORDER — DOXAZOSIN 2 MG/1
TABLET ORAL
Qty: 90 TABLET | Refills: 0 | Status: SHIPPED | OUTPATIENT
Start: 2019-12-26 | End: 2020-03-11

## 2020-01-08 ENCOUNTER — OFFICE VISIT (OUTPATIENT)
Dept: FAMILY MEDICINE CLINIC | Facility: CLINIC | Age: 85
End: 2020-01-08

## 2020-01-08 VITALS
TEMPERATURE: 97.5 F | HEART RATE: 86 BPM | WEIGHT: 154.6 LBS | BODY MASS INDEX: 25.14 KG/M2 | SYSTOLIC BLOOD PRESSURE: 120 MMHG | OXYGEN SATURATION: 98 % | DIASTOLIC BLOOD PRESSURE: 84 MMHG

## 2020-01-08 DIAGNOSIS — L29.9 ITCH: ICD-10-CM

## 2020-01-08 DIAGNOSIS — I10 ESSENTIAL HYPERTENSION: ICD-10-CM

## 2020-01-08 DIAGNOSIS — R41.3 MEMORY CHANGE: Primary | ICD-10-CM

## 2020-01-08 DIAGNOSIS — E03.9 ACQUIRED HYPOTHYROIDISM: ICD-10-CM

## 2020-01-08 LAB
BILIRUB BLD-MCNC: NEGATIVE MG/DL
GLUCOSE UR STRIP-MCNC: NEGATIVE MG/DL
KETONES UR QL: NEGATIVE
LEUKOCYTE EST, POC: NEGATIVE
NITRITE UR-MCNC: NEGATIVE MG/ML
PH UR: 6 [PH] (ref 5–8)
PROT UR STRIP-MCNC: NEGATIVE MG/DL
RBC # UR STRIP: NEGATIVE /UL
SP GR UR: 1.01 (ref 1–1.03)
UROBILINOGEN UR QL: NORMAL

## 2020-01-08 PROCEDURE — 99214 OFFICE O/P EST MOD 30 MIN: CPT | Performed by: FAMILY MEDICINE

## 2020-01-08 PROCEDURE — 81003 URINALYSIS AUTO W/O SCOPE: CPT | Performed by: FAMILY MEDICINE

## 2020-01-08 NOTE — PROGRESS NOTES
Subjective   Isabell Angel is a 85 y.o. female.     Chief Complaint   Patient presents with   • Altered Mental Status   • Medication Problem        Patient presents stating that she just felt kind of off the most of the month of December.  She was little confused.  She just did not feel like she could do anything.  She is up-to-date with her specialist.  She is supposed to see the hematologist again next week.  She was worried she may have had a stroke.  She denies any focal neurologic deficits however.  She is still fatigued but the shortness of breath is better than it was previously.  She is up-to-date with pulmonologist as well.  She has been concerned about itching all over.  She is compliant with her medications as listed.         The following portions of the patient's history were reviewed and updated as appropriate: allergies, current medications, past family history, past medical history, past social history, past surgical history and problem list.    Past Medical History:   Diagnosis Date   • Abdominal pain    • Abdominal wall strain    • Acid reflux    • Acute sinusitis    • Acute upper respiratory infection    • Allergic rhinitis, seasonal    • Anemia     iron deficiency anemia, resolved   • Aneurysm of ascending aorta (CMS/HCC)    • Anxiety disorder    • Aortic aneurysm (CMS/HCC)     MONITORED   • Arthritis    • Atrial fibrillation (CMS/HCC)    • Cataract    • Chronic periscapular pain on right side    • Chronic renal insufficiency    • Closed compression fracture of L1 vertebra (CMS/HCC)    • Colon distention    • Colon polyp    • Community acquired pneumonia    • Constipation    • Coronary artery disease    • Cough    • Cyst, bone    • Disease of thyroid gland    • DJD of shoulder    • Dyspnea    • Dysuria    • Edema    • Elevated brain natriuretic peptide (BNP) level    • Encounter for long-term (current) use of medications    • Esophageal reflux disease    • Fatigue    • Generalized abdominal  pain    • Glaucoma    • Glenohumeral arthritis, right    • H/O gastroesophageal reflux (GERD)    • Hematuria    • History of iron deficiency anemia    • Hypercalcemia     Due to increased calcium intake in addition to calcium supplements   • Hypercholesterolemia    • Hyperkalemia    • Hyperlipidemia    • Hyperpigmentation of skin of cheek    • Hypertension    • Hyponatremia    • Hypothyroidism    • IgG monoclonal gammopathy     IgG kappa type   • Incisional hernia    • Influenza    • Irregular heart beat     PACEMAKER   • Itching in the vaginal area    • Low vitamin B12 level    • Lumbago    • Memory change    • Monitoring for long-term anticoagulant use    • Nonvenomous insect bite    • Osteoarthritis of left glenohumeral joint    • Osteoarthritis of right glenohumeral joint    • Osteopenia    • Osteoporosis    • Paroxysmal atrial fibrillation (CMS/HCC)    • Periscapular pain    • Persistent atrial fibrillation    • Rash    • Right shoulder pain    • Shortness of breath    • Sick sinus syndrome (CMS/HCC)    • Sinus bradycardia    • Status post placement of cardiac pacemaker    • Tricuspid regurgitation    • Urinary body odor    • Urinary frequency    • Urinary tract infection    • Urinary urgency    • Viral enteritis    • Viral gastroenteritis    • Vitamin D deficiency    • Weakness    • Weakness of distal arms and legs        Past Surgical History:   Procedure Laterality Date   • ABDOMINAL SURGERY  09/18/2012    Pelvic abscess draining with appendectomy and resection and anastomosis of small intestine   • APPENDECTOMY  09/18/2012   • CARPAL TUNNEL RELEASE Bilateral    • CATARACT EXTRACTION     • CHOLECYSTECTOMY  08/31/2012   • COLON RESECTION SMALL BOWEL  09/2012    Removal of 1   foot of small bowel and abscess   • COLONOSCOPY N/A 4/13/2016    Procedure: COLONOSCOPY to cecum;  Surgeon: Pro Paredes MD;  Location: [facility] ENDOSCOPY;  Service:    • HYSTERECTOMY  1984   • INSERT / REPLACE / REMOVE  PACEMAKER      insertion   • OOPHORECTOMY     • PACEMAKER IMPLANTATION     • TONSILLECTOMY         Family History   Problem Relation Age of Onset   • Cancer Brother    • Hypertension Mother    • Heart disease Father    • Hypertension Sister    • Arthritis Other    • Macular degeneration Other    • Heart disease Other    • Other Other         colitis       Social History     Socioeconomic History   • Marital status:      Spouse name: Not on file   • Number of children: Not on file   • Years of education: Not on file   • Highest education level: Not on file   Occupational History     Employer: RETIRED   Tobacco Use   • Smoking status: Never Smoker   • Smokeless tobacco: Never Used   Substance and Sexual Activity   • Alcohol use: No   • Drug use: No   • Sexual activity: Defer   Social History Narrative    The patient is a . She is a retired . She does not smoke or drink.         Current Outpatient Medications:   •  apixaban (ELIQUIS) 5 MG tablet tablet, Take 5 mg by mouth 2 (Two) Times a Day., Disp: , Rfl:   •  atorvastatin (LIPITOR) 10 MG tablet, TAKE ONE TABLET BY MOUTH DAILY, Disp: 90 tablet, Rfl: 0  •  bumetanide (BUMEX) 1 MG tablet, Take 0.5 mg by mouth Daily., Disp: , Rfl:   •  doxazosin (CARDURA) 2 MG tablet, TAKE ONE TABLET BY MOUTH ONCE NIGHTLY, Disp: 90 tablet, Rfl: 0  •  famotidine (PEPCID) 40 MG tablet, TAKE ONE TABLET BY MOUTH TWICE A DAY FOR STOMACH, Disp: 180 tablet, Rfl: 2  •  fluticasone (FLONASE) 50 MCG/ACT nasal spray, 2 sprays into the nostril(s) as directed by provider Daily. Administer 2 sprays in each nostril for each dose., Disp: 48 mL, Rfl: 0  •  irbesartan (AVAPRO) 300 MG tablet, TAKE ONE TABLET BY MOUTH DAILY, Disp: 90 tablet, Rfl: 0  •  levothyroxine (SYNTHROID, LEVOTHROID) 150 MCG tablet, TAKE ONE TABLET BY MOUTH DAILY AND TAKE ONE-HALF TABLET BY MOUTH ON MONDAYS, Disp: 84 tablet, Rfl: 0  •  metoprolol succinate XL (TOPROL-XL) 100 MG 24 hr tablet, Take 1.5 tablets by  mouth Daily., Disp: 135 tablet, Rfl: 0  •  timolol (TIMOPTIC) 0.5 % ophthalmic solution, Administer 1 drop to both eyes Daily., Disp: , Rfl:   •  vitamin B-12 (CYANOCOBALAMIN) 1000 MCG tablet, Take 1 tablet by mouth Daily., Disp: , Rfl:     Review of Systems   Constitutional: Positive for fatigue. Negative for chills and fever.   HENT: Negative for congestion, rhinorrhea and sore throat.    Respiratory: Positive for shortness of breath. Negative for cough.    Cardiovascular: Negative for chest pain and leg swelling.   Gastrointestinal: Negative for abdominal pain.   Endocrine: Negative for polydipsia and polyuria.   Genitourinary: Negative for dysuria.   Musculoskeletal: Negative for arthralgias and myalgias.   Skin: Negative for rash.   Neurological: Negative for dizziness.   Hematological: Does not bruise/bleed easily.   Psychiatric/Behavioral: Positive for confusion. Negative for dysphoric mood and sleep disturbance. The patient is not nervous/anxious.        Objective   Vitals:    01/08/20 1032   BP: 120/84   Pulse: 86   Temp: 97.5 °F (36.4 °C)   SpO2: 98%   Weight: 70.1 kg (154 lb 9.6 oz)     Body mass index is 25.14 kg/m².  Physical Exam   Constitutional: She is oriented to person, place, and time. She appears well-developed and well-nourished.   HENT:   Head: Normocephalic and atraumatic.   Eyes: Pupils are equal, round, and reactive to light. Conjunctivae and EOM are normal.   Neck: Neck supple. No thyromegaly present.   Cardiovascular: Normal rate and regular rhythm.   No murmur heard.  Pulmonary/Chest: Effort normal. She has decreased breath sounds. She has no wheezes. She has no rhonchi.   Abdominal: Soft.   Musculoskeletal: Normal range of motion.   Neurological: She is alert and oriented to person, place, and time. No cranial nerve deficit.   Skin: Skin is warm and dry.   Psychiatric: She has a normal mood and affect. Her behavior is normal. Judgment and thought content normal.       Office Visit on  01/08/2020   Component Date Value Ref Range Status   • Specific Gravity  01/08/2020 1.015  1.005 - 1.030 Final   • pH, Urine 01/08/2020 6.0  5.0 - 8.0 Final   • Leukocytes 01/08/2020 Negative  Negative Final   • Nitrite, UA 01/08/2020 Negative  Negative Final   • Protein, POC 01/08/2020 Negative  Negative mg/dL Final   • Glucose, UA 01/08/2020 Negative  Negative, 1000 mg/dL (3+) mg/dL Final   • Ketones, UA 01/08/2020 Negative  Negative Final   • Urobilinogen, UA 01/08/2020 Normal  Normal Final   • Bilirubin 01/08/2020 Negative  Negative Final   • Blood, UA 01/08/2020 Negative  Negative Final          Assessment/Plan   Isabell was seen today for altered mental status and medication problem.    Diagnoses and all orders for this visit:    Memory change  -     CBC & Differential  -     Comprehensive Metabolic Panel  -     TSH  -     POC Urinalysis Dipstick, Automated  -     Vitamin B12  -     RPR    Essential hypertension    Acquired hypothyroidism    Itch               There are no Patient Instructions on file for this visit.

## 2020-01-09 ENCOUNTER — APPOINTMENT (OUTPATIENT)
Dept: LAB | Facility: HOSPITAL | Age: 85
End: 2020-01-09

## 2020-01-09 LAB
ALBUMIN SERPL-MCNC: 3.8 G/DL (ref 3.5–4.7)
ALBUMIN/GLOB SERPL: 0.9 {RATIO} (ref 1.2–2.2)
ALP SERPL-CCNC: 57 IU/L (ref 39–117)
ALT SERPL-CCNC: 9 IU/L (ref 0–32)
AST SERPL-CCNC: 17 IU/L (ref 0–40)
BASOPHILS # BLD AUTO: 0 X10E3/UL (ref 0–0.2)
BASOPHILS NFR BLD AUTO: 0 %
BILIRUB SERPL-MCNC: 0.6 MG/DL (ref 0–1.2)
BUN SERPL-MCNC: 27 MG/DL (ref 8–27)
BUN/CREAT SERPL: 24 (ref 12–28)
CALCIUM SERPL-MCNC: 10 MG/DL (ref 8.7–10.3)
CHLORIDE SERPL-SCNC: 102 MMOL/L (ref 96–106)
CO2 SERPL-SCNC: 24 MMOL/L (ref 20–29)
CREAT SERPL-MCNC: 1.11 MG/DL (ref 0.57–1)
EOSINOPHIL # BLD AUTO: 0.1 X10E3/UL (ref 0–0.4)
EOSINOPHIL NFR BLD AUTO: 1 %
ERYTHROCYTE [DISTWIDTH] IN BLOOD BY AUTOMATED COUNT: 14.6 % (ref 11.7–15.4)
GLOBULIN SER CALC-MCNC: 4.3 G/DL (ref 1.5–4.5)
GLUCOSE SERPL-MCNC: 86 MG/DL (ref 65–99)
HCT VFR BLD AUTO: 34.9 % (ref 34–46.6)
HGB BLD-MCNC: 11.3 G/DL (ref 11.1–15.9)
IMM GRANULOCYTES # BLD AUTO: 0 X10E3/UL (ref 0–0.1)
IMM GRANULOCYTES NFR BLD AUTO: 0 %
LYMPHOCYTES # BLD AUTO: 1.4 X10E3/UL (ref 0.7–3.1)
LYMPHOCYTES NFR BLD AUTO: 34 %
MCH RBC QN AUTO: 32.3 PG (ref 26.6–33)
MCHC RBC AUTO-ENTMCNC: 32.4 G/DL (ref 31.5–35.7)
MCV RBC AUTO: 100 FL (ref 79–97)
MONOCYTES # BLD AUTO: 0.6 X10E3/UL (ref 0.1–0.9)
MONOCYTES NFR BLD AUTO: 14 %
NEUTROPHILS # BLD AUTO: 2.2 X10E3/UL (ref 1.4–7)
NEUTROPHILS NFR BLD AUTO: 51 %
PLATELET # BLD AUTO: 122 X10E3/UL (ref 150–450)
POTASSIUM SERPL-SCNC: 4.4 MMOL/L (ref 3.5–5.2)
PROT SERPL-MCNC: 8.1 G/DL (ref 6–8.5)
RBC # BLD AUTO: 3.5 X10E6/UL (ref 3.77–5.28)
RPR SER QL: NON REACTIVE
SODIUM SERPL-SCNC: 139 MMOL/L (ref 134–144)
TSH SERPL DL<=0.005 MIU/L-ACNC: 0.51 UIU/ML (ref 0.45–4.5)
VIT B12 SERPL-MCNC: 433 PG/ML (ref 232–1245)
WBC # BLD AUTO: 4.2 X10E3/UL (ref 3.4–10.8)

## 2020-01-21 ENCOUNTER — LAB (OUTPATIENT)
Dept: LAB | Facility: HOSPITAL | Age: 85
End: 2020-01-21

## 2020-01-21 DIAGNOSIS — D51.9 ANEMIA DUE TO VITAMIN B12 DEFICIENCY, UNSPECIFIED B12 DEFICIENCY TYPE: ICD-10-CM

## 2020-01-21 DIAGNOSIS — N17.9 ACUTE KIDNEY INJURY (HCC): ICD-10-CM

## 2020-01-21 DIAGNOSIS — D47.2 IGG MONOCLONAL GAMMOPATHY: ICD-10-CM

## 2020-01-21 DIAGNOSIS — E53.8 B12 DEFICIENCY: ICD-10-CM

## 2020-01-21 LAB
ALBUMIN SERPL-MCNC: 3.9 G/DL (ref 3.5–5.2)
ALBUMIN/GLOB SERPL: 0.8 G/DL (ref 1.1–2.4)
ALP SERPL-CCNC: 56 U/L (ref 38–116)
ALT SERPL W P-5'-P-CCNC: 10 U/L (ref 0–33)
ANION GAP SERPL CALCULATED.3IONS-SCNC: 10.8 MMOL/L (ref 5–15)
AST SERPL-CCNC: 20 U/L (ref 0–32)
B2 MICROGLOB SERPL-MCNC: 13.1 MG/L (ref 0.8–2.2)
BASOPHILS # BLD AUTO: 0.02 10*3/MM3 (ref 0–0.2)
BASOPHILS NFR BLD AUTO: 0.4 % (ref 0–1.5)
BILIRUB SERPL-MCNC: 0.5 MG/DL (ref 0.2–1.2)
BUN BLD-MCNC: 30 MG/DL (ref 6–20)
BUN/CREAT SERPL: 27.8 (ref 7.3–30)
CALCIUM SPEC-SCNC: 10 MG/DL (ref 8.5–10.2)
CHLORIDE SERPL-SCNC: 99 MMOL/L (ref 98–107)
CO2 SERPL-SCNC: 27.2 MMOL/L (ref 22–29)
CREAT BLD-MCNC: 1.08 MG/DL (ref 0.6–1.1)
DEPRECATED RDW RBC AUTO: 53.5 FL (ref 37–54)
EOSINOPHIL # BLD AUTO: 0.04 10*3/MM3 (ref 0–0.4)
EOSINOPHIL NFR BLD AUTO: 0.9 % (ref 0.3–6.2)
ERYTHROCYTE [DISTWIDTH] IN BLOOD BY AUTOMATED COUNT: 14.1 % (ref 12.3–15.4)
GFR SERPL CREATININE-BSD FRML MDRD: 48 ML/MIN/1.73
GLOBULIN UR ELPH-MCNC: 4.9 GM/DL (ref 1.8–3.5)
GLUCOSE BLD-MCNC: 91 MG/DL (ref 74–124)
HCT VFR BLD AUTO: 37.8 % (ref 34–46.6)
HGB BLD-MCNC: 12.3 G/DL (ref 12–15.9)
IMM GRANULOCYTES # BLD AUTO: 0.01 10*3/MM3 (ref 0–0.05)
IMM GRANULOCYTES NFR BLD AUTO: 0.2 % (ref 0–0.5)
LYMPHOCYTES # BLD AUTO: 1.74 10*3/MM3 (ref 0.7–3.1)
LYMPHOCYTES NFR BLD AUTO: 37.3 % (ref 19.6–45.3)
MCH RBC QN AUTO: 33.5 PG (ref 26.6–33)
MCHC RBC AUTO-ENTMCNC: 32.5 G/DL (ref 31.5–35.7)
MCV RBC AUTO: 103 FL (ref 79–97)
MONOCYTES # BLD AUTO: 0.74 10*3/MM3 (ref 0.1–0.9)
MONOCYTES NFR BLD AUTO: 15.8 % (ref 5–12)
NEUTROPHILS # BLD AUTO: 2.12 10*3/MM3 (ref 1.7–7)
NEUTROPHILS NFR BLD AUTO: 45.4 % (ref 42.7–76)
NRBC BLD AUTO-RTO: 0 /100 WBC (ref 0–0.2)
PLATELET # BLD AUTO: 148 10*3/MM3 (ref 140–450)
PMV BLD AUTO: 9.9 FL (ref 6–12)
POTASSIUM BLD-SCNC: 4.3 MMOL/L (ref 3.5–4.7)
PROT SERPL-MCNC: 8.8 G/DL (ref 6.3–8)
RBC # BLD AUTO: 3.67 10*6/MM3 (ref 3.77–5.28)
SODIUM BLD-SCNC: 137 MMOL/L (ref 134–145)
WBC NRBC COR # BLD: 4.67 10*3/MM3 (ref 3.4–10.8)

## 2020-01-21 PROCEDURE — 80053 COMPREHEN METABOLIC PANEL: CPT

## 2020-01-21 PROCEDURE — 82232 ASSAY OF BETA-2 PROTEIN: CPT | Performed by: INTERNAL MEDICINE

## 2020-01-21 PROCEDURE — 85025 COMPLETE CBC W/AUTO DIFF WBC: CPT

## 2020-01-21 PROCEDURE — 36415 COLL VENOUS BLD VENIPUNCTURE: CPT

## 2020-01-22 LAB
ALBUMIN SERPL-MCNC: 3.7 G/DL (ref 2.9–4.4)
ALBUMIN/GLOB SERPL: 0.8 {RATIO} (ref 0.7–1.7)
ALPHA1 GLOB FLD ELPH-MCNC: 0.2 G/DL (ref 0–0.4)
ALPHA2 GLOB SERPL ELPH-MCNC: 0.7 G/DL (ref 0.4–1)
B-GLOBULIN SERPL ELPH-MCNC: 0.7 G/DL (ref 0.7–1.3)
GAMMA GLOB SERPL ELPH-MCNC: 3.1 G/DL (ref 0.4–1.8)
GLOBULIN SER CALC-MCNC: 4.7 G/DL (ref 2.2–3.9)
IGA SERPL-MCNC: 34 MG/DL (ref 64–422)
IGG SERPL-MCNC: 3762 MG/DL (ref 700–1600)
IGM SERPL-MCNC: 12 MG/DL (ref 26–217)
INTERPRETATION SERPL IEP-IMP: ABNORMAL
KAPPA LC SERPL-MCNC: 102.5 MG/L (ref 3.3–19.4)
KAPPA LC/LAMBDA SER: 11.52 {RATIO} (ref 0.26–1.65)
LAMBDA LC FREE SERPL-MCNC: 8.9 MG/L (ref 5.7–26.3)
Lab: ABNORMAL
M-SPIKE: 2.9 G/DL
PROT SERPL-MCNC: 8.4 G/DL (ref 6–8.5)

## 2020-01-28 ENCOUNTER — APPOINTMENT (OUTPATIENT)
Dept: LAB | Facility: HOSPITAL | Age: 85
End: 2020-01-28

## 2020-01-28 ENCOUNTER — OFFICE VISIT (OUTPATIENT)
Dept: ONCOLOGY | Facility: CLINIC | Age: 85
End: 2020-01-28

## 2020-01-28 VITALS
DIASTOLIC BLOOD PRESSURE: 83 MMHG | WEIGHT: 155.2 LBS | RESPIRATION RATE: 16 BRPM | BODY MASS INDEX: 24.94 KG/M2 | TEMPERATURE: 98.1 F | HEART RATE: 77 BPM | HEIGHT: 66 IN | OXYGEN SATURATION: 96 % | SYSTOLIC BLOOD PRESSURE: 134 MMHG

## 2020-01-28 DIAGNOSIS — D51.9 ANEMIA DUE TO VITAMIN B12 DEFICIENCY, UNSPECIFIED B12 DEFICIENCY TYPE: ICD-10-CM

## 2020-01-28 DIAGNOSIS — D47.2 IGG MONOCLONAL GAMMOPATHY: Primary | ICD-10-CM

## 2020-01-28 DIAGNOSIS — N17.9 ACUTE KIDNEY INJURY (HCC): ICD-10-CM

## 2020-01-28 PROCEDURE — 99213 OFFICE O/P EST LOW 20 MIN: CPT | Performed by: INTERNAL MEDICINE

## 2020-01-28 NOTE — PROGRESS NOTES
Subjective     CHIEF COMPLAINT:      Chief Complaint   Patient presents with   • Follow-up     NO COCERNS       HISTORY OF PRESENT ILLNESS:     Isabell Angel is a 85 y.o. female patient who returns today for follow up on her monoclonal gammopathy.  She returns today for follow-up reporting no new symptoms.  She continues to be active.  She is currently working on having her house on the market and she is busy doing that.  She reports fatigue.  She falls asleep easily during the daytime.    Patient is not reporting back pain    REVIEW OF SYSTEMS:  Review of Systems   Constitutional: Negative for chills, fever and unexpected weight change.   HENT: Negative for mouth sores, nosebleeds, sore throat and voice change.    Eyes: Negative for visual disturbance.   Respiratory: Negative for cough and shortness of breath.    Cardiovascular: Negative for chest pain and leg swelling.   Gastrointestinal: Negative for abdominal pain, blood in stool, constipation, diarrhea, nausea and vomiting.   Genitourinary: Negative for dysuria, frequency and hematuria.   Musculoskeletal: Negative for arthralgias, back pain and joint swelling.   Skin: Negative for rash.   Neurological: Negative for dizziness, numbness and headaches.   Hematological: Negative for adenopathy. Does not bruise/bleed easily.   Psychiatric/Behavioral: Negative for dysphoric mood. The patient is not nervous/anxious.      I verified the ROS obtained by the MA.      MEDICATIONS:    Current Outpatient Medications:   •  apixaban (ELIQUIS) 5 MG tablet tablet, Take 5 mg by mouth 2 (Two) Times a Day., Disp: , Rfl:   •  atorvastatin (LIPITOR) 10 MG tablet, TAKE ONE TABLET BY MOUTH DAILY, Disp: 90 tablet, Rfl: 0  •  bumetanide (BUMEX) 1 MG tablet, Take 0.5 mg by mouth Daily., Disp: , Rfl:   •  doxazosin (CARDURA) 2 MG tablet, TAKE ONE TABLET BY MOUTH ONCE NIGHTLY, Disp: 90 tablet, Rfl: 0  •  famotidine (PEPCID) 40 MG tablet, TAKE ONE TABLET BY MOUTH TWICE A DAY FOR STOMACH,  "Disp: 180 tablet, Rfl: 2  •  fluticasone (FLONASE) 50 MCG/ACT nasal spray, 2 sprays into the nostril(s) as directed by provider Daily. Administer 2 sprays in each nostril for each dose., Disp: 48 mL, Rfl: 0  •  irbesartan (AVAPRO) 300 MG tablet, TAKE ONE TABLET BY MOUTH DAILY, Disp: 90 tablet, Rfl: 0  •  levothyroxine (SYNTHROID, LEVOTHROID) 150 MCG tablet, TAKE ONE TABLET BY MOUTH DAILY AND TAKE ONE-HALF TABLET BY MOUTH ON MONDAYS, Disp: 84 tablet, Rfl: 0  •  metoprolol succinate XL (TOPROL-XL) 100 MG 24 hr tablet, Take 1.5 tablets by mouth Daily., Disp: 135 tablet, Rfl: 0  •  timolol (TIMOPTIC) 0.5 % ophthalmic solution, Administer 1 drop to both eyes Daily., Disp: , Rfl:   •  vitamin B-12 (CYANOCOBALAMIN) 1000 MCG tablet, Take 1 tablet by mouth Daily., Disp: , Rfl:     ALLERGIES:  Allergies   Allergen Reactions   • Penicillin V Potassium Swelling     Throat swelled   • Latex Itching         Objective   VITAL SIGNS:     Vitals:    01/28/20 1043   BP: 134/83   Pulse: 77   Resp: 16   Temp: 98.1 °F (36.7 °C)   TempSrc: Oral   SpO2: 96%   Weight: 70.4 kg (155 lb 3.2 oz)   Height: 167 cm (65.75\")   PainSc: 0-No pain     Body mass index is 25.24 kg/m².     Wt Readings from Last 3 Encounters:   01/28/20 70.4 kg (155 lb 3.2 oz)   01/08/20 70.1 kg (154 lb 9.6 oz)   12/05/19 69.3 kg (152 lb 12.8 oz)       PHYSICAL EXAMINATION:  GENERAL:  The patient appears in good general condition, not in acute distress.  SKIN: Warm and dry. No skin rashes. No ecchymosis.  HEAD:  Normocephalic.  EYES:  No Jaundice. No Pallor.  CARDIAC:  No edema.  EXTREMITIES:  No clubbing. No deforming arthritis in the hands.   NEUROLOGICAL:  No Focal neurological deficits.       DIAGNOSTIC DATA:     Results from last 7 days   Lab Units 01/21/20  1136   WBC 10*3/mm3 4.67   NEUTROS ABS 10*3/mm3 2.12   HEMOGLOBIN g/dL 12.3   HEMATOCRIT % 37.8   PLATELETS 10*3/mm3 148     Results from last 7 days   Lab Units 01/21/20  1136   SODIUM mmol/L 137   POTASSIUM " mmol/L 4.3   CHLORIDE mmol/L 99   CO2 mmol/L 27.2   BUN mg/dL 30*   CREATININE mg/dL 1.08   CALCIUM mg/dL 10.0   ALBUMIN g/dL 3.90  3.7   BILIRUBIN mg/dL 0.5   ALK PHOS U/L 56   ALT (SGPT) U/L 10   AST (SGOT) U/L 20   GLUCOSE mg/dL 91     Component      Latest Ref Rng & Units 10/1/2019 11/18/2019 1/21/2020   IgG      700 - 1600 mg/dL 3389 (H) 3788 (H) 3762 (H)   IgA      64 - 422 mg/dL 37 (L) 35 (L) 34 (L)   IgM      26 - 217 mg/dL 12 (L) 14 (L) 12 (L)   Total Protein      6.0 - 8.5 g/dL 7.7 8.1 8.4   Albumin      2.9 - 4.4 g/dL 3.5 3.6 3.7   Alpha-1-Globulin      0.0 - 0.4 g/dL 0.3 0.3 0.2   Alpha-2-Globulin      0.4 - 1.0 g/dL 0.6 0.7 0.7   Beta Globulin      0.7 - 1.3 g/dL 0.6 (L) 0.7 0.7   Gamma Globulin      0.4 - 1.8 g/dL 2.7 (H) 2.9 (H) 3.1 (H)   M-William      Not Observed g/dL 2.5 (H) 2.8 (H) 2.9 (H)   Globulin      2.2 - 3.9 g/dL 4.2 (H) 4.5 (H) 4.7 (H)   A/G Ratio      0.7 - 1.7 0.9 0.9 0.8   Immunofixation Reflex, Serum       Comment Comment Comment   Please note       Comment Comment Comment   Kappa FLC      3.3 - 19.4 mg/L 70.6 (H) 72.6 (H) 102.5 (H)   Free Lambda Light Chains      5.7 - 26.3 mg/L 10.5 10.4 8.9   Kappa/Lambda Ratio      0.26 - 1.65 6.72 (H) 6.98 (H) 11.52 (H)   Beta-2 Microglobulin      0.8 - 2.2 mg/L 12.8 (H) 12.0 (H) 13.1 (H)       Assessment/Plan   1.  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    · M protein increased to 2.7 on 3/19/2019.    · M protein decreased to 2.2 on 8/15/2019.    · M protein increased to 2.5 on 10/1/19.  · M protein increased to 2.8 on 11/18/2019  · Skeletal survey on 10/8/2019 showed no lytic lesions.  · B2M was 12 on 11/18/2019 and was down from 12.8 on 10/1/2019.  · Patient's kidney function improved compared to August 2019.  However, she continues to have hypercalcemia.  I told the patient I am concerned that this may represent progression to active multiple myeloma.  I recommended proceeding with bone marrow biopsy.  However,  the patient wanted to wait until she has her lung and heart issues taken care of.  · M protein increased to 2.9 on 1/21/2020.  B2 M increased to 13.1.  · I recommended having bone marrow biopsy to evaluate possible transformation from MGUS to multiple myeloma.  Patient indicated she is quite busy and asked if this can be somewhat delayed.    2.  Anemia due to vitamin B12 deficiency.  She is on vitamin B12 1000 mcg daily.  Hemoglobin is currently normal.    3.  Acute kidney injury due to diuretics.  Creatinine is at the upper end of normal at 1.08 with creatinine clearance of 48.      PLAN:    We will repeat paraprotein studies in 2 months.  CBC CMP SPEP JARED FLC and B2 M will be obtained in mid March 2020.  We will see her in follow-up 1 week after to review results.        Hanna Santana MD  01/28/20

## 2020-02-13 DIAGNOSIS — J30.9 ALLERGIC RHINITIS, UNSPECIFIED SEASONALITY, UNSPECIFIED TRIGGER: ICD-10-CM

## 2020-02-13 RX ORDER — FLUTICASONE PROPIONATE 50 MCG
2 SPRAY, SUSPENSION (ML) NASAL DAILY
Qty: 48 ML | Refills: 0 | Status: SHIPPED | OUTPATIENT
Start: 2020-02-13 | End: 2020-03-16

## 2020-02-18 RX ORDER — IRBESARTAN 300 MG/1
300 TABLET ORAL DAILY
Qty: 90 TABLET | Refills: 1 | Status: SHIPPED | OUTPATIENT
Start: 2020-02-18 | End: 2020-08-31

## 2020-03-05 RX ORDER — LEVOTHYROXINE SODIUM 0.15 MG/1
TABLET ORAL
Qty: 84 TABLET | Refills: 0 | Status: SHIPPED | OUTPATIENT
Start: 2020-03-05 | End: 2020-05-18

## 2020-03-09 RX ORDER — ATORVASTATIN CALCIUM 10 MG/1
10 TABLET, FILM COATED ORAL DAILY
Qty: 90 TABLET | Refills: 0 | Status: SHIPPED | OUTPATIENT
Start: 2020-03-09 | End: 2020-05-29

## 2020-03-09 RX ORDER — FAMOTIDINE 40 MG/1
40 TABLET, FILM COATED ORAL 2 TIMES DAILY
Qty: 180 TABLET | Refills: 2 | Status: SHIPPED | OUTPATIENT
Start: 2020-03-09 | End: 2020-12-02 | Stop reason: SDUPTHER

## 2020-03-11 RX ORDER — DOXAZOSIN 2 MG/1
TABLET ORAL
Qty: 90 TABLET | Refills: 1 | Status: SHIPPED | OUTPATIENT
Start: 2020-03-11 | End: 2020-07-24

## 2020-03-11 RX ORDER — METOPROLOL SUCCINATE 100 MG/1
150 TABLET, EXTENDED RELEASE ORAL DAILY
Qty: 135 TABLET | Refills: 0 | Status: SHIPPED | OUTPATIENT
Start: 2020-03-11 | End: 2020-06-08

## 2020-03-16 DIAGNOSIS — J30.9 ALLERGIC RHINITIS, UNSPECIFIED SEASONALITY, UNSPECIFIED TRIGGER: ICD-10-CM

## 2020-03-16 RX ORDER — FLUTICASONE PROPIONATE 50 MCG
SPRAY, SUSPENSION (ML) NASAL
Qty: 48 G | Refills: 2 | Status: SHIPPED | OUTPATIENT
Start: 2020-03-16

## 2020-04-06 ENCOUNTER — APPOINTMENT (OUTPATIENT)
Dept: LAB | Facility: HOSPITAL | Age: 85
End: 2020-04-06

## 2020-05-07 ENCOUNTER — LAB (OUTPATIENT)
Dept: LAB | Facility: HOSPITAL | Age: 85
End: 2020-05-07

## 2020-05-07 DIAGNOSIS — D47.2 IGG MONOCLONAL GAMMOPATHY: ICD-10-CM

## 2020-05-07 LAB
ALBUMIN SERPL-MCNC: 3.8 G/DL (ref 3.5–5.2)
ALBUMIN/GLOB SERPL: 0.7 G/DL (ref 1.1–2.4)
ALP SERPL-CCNC: 60 U/L (ref 38–116)
ALT SERPL W P-5'-P-CCNC: 10 U/L (ref 0–33)
ANION GAP SERPL CALCULATED.3IONS-SCNC: 8.9 MMOL/L (ref 5–15)
AST SERPL-CCNC: 18 U/L (ref 0–32)
B2 MICROGLOB SERPL-MCNC: 7.4 MG/L (ref 0.8–2.2)
BASOPHILS # BLD AUTO: 0.01 10*3/MM3 (ref 0–0.2)
BASOPHILS NFR BLD AUTO: 0.2 % (ref 0–1.5)
BILIRUB SERPL-MCNC: 0.5 MG/DL (ref 0.2–1.2)
BUN BLD-MCNC: 29 MG/DL (ref 6–20)
BUN/CREAT SERPL: 28.7 (ref 7.3–30)
CALCIUM SPEC-SCNC: 10.3 MG/DL (ref 8.5–10.2)
CHLORIDE SERPL-SCNC: 102 MMOL/L (ref 98–107)
CO2 SERPL-SCNC: 28.1 MMOL/L (ref 22–29)
CREAT BLD-MCNC: 1.01 MG/DL (ref 0.6–1.1)
DEPRECATED RDW RBC AUTO: 53 FL (ref 37–54)
EOSINOPHIL # BLD AUTO: 0.07 10*3/MM3 (ref 0–0.4)
EOSINOPHIL NFR BLD AUTO: 1.2 % (ref 0.3–6.2)
ERYTHROCYTE [DISTWIDTH] IN BLOOD BY AUTOMATED COUNT: 13.5 % (ref 12.3–15.4)
GFR SERPL CREATININE-BSD FRML MDRD: 52 ML/MIN/1.73
GLOBULIN UR ELPH-MCNC: 5.1 GM/DL (ref 1.8–3.5)
GLUCOSE BLD-MCNC: 75 MG/DL (ref 74–124)
HCT VFR BLD AUTO: 38.6 % (ref 34–46.6)
HGB BLD-MCNC: 12.2 G/DL (ref 12–15.9)
IMM GRANULOCYTES # BLD AUTO: 0.01 10*3/MM3 (ref 0–0.05)
IMM GRANULOCYTES NFR BLD AUTO: 0.2 % (ref 0–0.5)
LYMPHOCYTES # BLD AUTO: 1.97 10*3/MM3 (ref 0.7–3.1)
LYMPHOCYTES NFR BLD AUTO: 32.6 % (ref 19.6–45.3)
MCH RBC QN AUTO: 33.2 PG (ref 26.6–33)
MCHC RBC AUTO-ENTMCNC: 31.6 G/DL (ref 31.5–35.7)
MCV RBC AUTO: 104.9 FL (ref 79–97)
MONOCYTES # BLD AUTO: 0.77 10*3/MM3 (ref 0.1–0.9)
MONOCYTES NFR BLD AUTO: 12.7 % (ref 5–12)
NEUTROPHILS # BLD AUTO: 3.22 10*3/MM3 (ref 1.7–7)
NEUTROPHILS NFR BLD AUTO: 53.1 % (ref 42.7–76)
NRBC BLD AUTO-RTO: 0 /100 WBC (ref 0–0.2)
PLATELET # BLD AUTO: 127 10*3/MM3 (ref 140–450)
PMV BLD AUTO: 9.5 FL (ref 6–12)
POTASSIUM BLD-SCNC: 4.1 MMOL/L (ref 3.5–4.7)
PROT SERPL-MCNC: 8.9 G/DL (ref 6.3–8)
RBC # BLD AUTO: 3.68 10*6/MM3 (ref 3.77–5.28)
SODIUM BLD-SCNC: 139 MMOL/L (ref 134–145)
WBC NRBC COR # BLD: 6.05 10*3/MM3 (ref 3.4–10.8)

## 2020-05-07 PROCEDURE — 80053 COMPREHEN METABOLIC PANEL: CPT

## 2020-05-07 PROCEDURE — 82232 ASSAY OF BETA-2 PROTEIN: CPT | Performed by: INTERNAL MEDICINE

## 2020-05-07 PROCEDURE — 85025 COMPLETE CBC W/AUTO DIFF WBC: CPT

## 2020-05-07 PROCEDURE — 36415 COLL VENOUS BLD VENIPUNCTURE: CPT

## 2020-05-08 LAB
ALBUMIN SERPL-MCNC: 3.8 G/DL (ref 2.9–4.4)
ALBUMIN/GLOB SERPL: 0.9 {RATIO} (ref 0.7–1.7)
ALPHA1 GLOB FLD ELPH-MCNC: 0.2 G/DL (ref 0–0.4)
ALPHA2 GLOB SERPL ELPH-MCNC: 0.7 G/DL (ref 0.4–1)
B-GLOBULIN SERPL ELPH-MCNC: 0.7 G/DL (ref 0.7–1.3)
GAMMA GLOB SERPL ELPH-MCNC: 3.1 G/DL (ref 0.4–1.8)
GLOBULIN SER CALC-MCNC: 4.7 G/DL (ref 2.2–3.9)
IGA SERPL-MCNC: 31 MG/DL (ref 64–422)
IGG SERPL-MCNC: 3866 MG/DL (ref 586–1602)
IGM SERPL-MCNC: 11 MG/DL (ref 26–217)
INTERPRETATION SERPL IEP-IMP: ABNORMAL
KAPPA LC SERPL-MCNC: 67.2 MG/L (ref 3.3–19.4)
KAPPA LC/LAMBDA SER: 9.46 {RATIO} (ref 0.26–1.65)
LAMBDA LC FREE SERPL-MCNC: 7.1 MG/L (ref 5.7–26.3)
Lab: ABNORMAL
M-SPIKE: 2.9 G/DL
PROT SERPL-MCNC: 8.5 G/DL (ref 6–8.5)

## 2020-05-14 ENCOUNTER — APPOINTMENT (OUTPATIENT)
Dept: LAB | Facility: HOSPITAL | Age: 85
End: 2020-05-14

## 2020-05-14 ENCOUNTER — OFFICE VISIT (OUTPATIENT)
Dept: ONCOLOGY | Facility: CLINIC | Age: 85
End: 2020-05-14

## 2020-05-14 DIAGNOSIS — E83.52 HYPERCALCEMIA: ICD-10-CM

## 2020-05-14 DIAGNOSIS — D47.2 IGG MONOCLONAL GAMMOPATHY: Primary | ICD-10-CM

## 2020-05-14 DIAGNOSIS — D69.6 THROMBOCYTOPENIA (HCC): ICD-10-CM

## 2020-05-14 PROCEDURE — 99441 PR PHYS/QHP TELEPHONE EVALUATION 5-10 MIN: CPT | Performed by: INTERNAL MEDICINE

## 2020-05-14 NOTE — PROGRESS NOTES
Subjective     CHIEF COMPLAINT:      Monoclonal gammopathy of undetermined significance    HISTORY OF PRESENT ILLNESS:     Isabell Angel is a 85 y.o. female patient with MGUS, anemia and thrombocytopenia.  Patient is on vitamin B12 daily.  She reports feeling good.  She is busy getting her house ready as she is trying to sell it.  She reports baseline shortness of breath with no worsening.  No new areas of pain.  Specifically, no back, rib or hip pain.        REVIEW OF SYSTEMS:  Review of Systems   Constitutional: Negative for fatigue.   Respiratory: Positive for shortness of breath.    Cardiovascular: Negative for leg swelling.   Musculoskeletal: Positive for arthralgias. Negative for back pain.         Past Medical History:   Diagnosis Date   • Abdominal pain    • Abdominal wall strain    • Acid reflux    • Acute sinusitis    • Acute upper respiratory infection    • Allergic rhinitis, seasonal    • Anemia     iron deficiency anemia, resolved   • Aneurysm of ascending aorta (CMS/HCC)    • Anxiety disorder    • Aortic aneurysm (CMS/HCC)     MONITORED   • Arthritis    • Atrial fibrillation (CMS/HCC)    • Cataract    • Chronic periscapular pain on right side    • Chronic renal insufficiency    • Closed compression fracture of L1 vertebra (CMS/HCC)    • Colon distention    • Colon polyp    • Community acquired pneumonia    • Constipation    • Coronary artery disease    • Cough    • Cyst, bone    • Disease of thyroid gland    • DJD of shoulder    • Dyspnea    • Dysuria    • Edema    • Elevated brain natriuretic peptide (BNP) level    • Encounter for long-term (current) use of medications    • Esophageal reflux disease    • Fatigue    • Generalized abdominal pain    • Glaucoma    • Glenohumeral arthritis, right    • H/O gastroesophageal reflux (GERD)    • Hematuria    • History of iron deficiency anemia    • Hypercalcemia     Due to increased calcium intake in addition to calcium supplements   • Hypercholesterolemia     • Hyperkalemia    • Hyperlipidemia    • Hyperpigmentation of skin of cheek    • Hypertension    • Hyponatremia    • Hypothyroidism    • IgG monoclonal gammopathy     IgG kappa type   • Incisional hernia    • Influenza    • Irregular heart beat     PACEMAKER   • Itching in the vaginal area    • Low vitamin B12 level    • Lumbago    • Memory change    • Monitoring for long-term anticoagulant use    • Nonvenomous insect bite    • Osteoarthritis of left glenohumeral joint    • Osteoarthritis of right glenohumeral joint    • Osteopenia    • Osteoporosis    • Paroxysmal atrial fibrillation (CMS/HCC)    • Periscapular pain    • Persistent atrial fibrillation    • Rash    • Right shoulder pain    • Shortness of breath    • Sick sinus syndrome (CMS/HCC)    • Sinus bradycardia    • Status post placement of cardiac pacemaker    • Tricuspid regurgitation    • Urinary body odor    • Urinary frequency    • Urinary tract infection    • Urinary urgency    • Viral enteritis    • Viral gastroenteritis    • Vitamin D deficiency    • Weakness    • Weakness of distal arms and legs        Past Surgical History:   Procedure Laterality Date   • ABDOMINAL SURGERY  09/18/2012    Pelvic abscess draining with appendectomy and resection and anastomosis of small intestine   • APPENDECTOMY  09/18/2012   • CARPAL TUNNEL RELEASE Bilateral    • CATARACT EXTRACTION     • CHOLECYSTECTOMY  08/31/2012   • COLON RESECTION SMALL BOWEL  09/2012    Removal of 1   foot of small bowel and abscess   • COLONOSCOPY N/A 4/13/2016    Procedure: COLONOSCOPY to cecum;  Surgeon: Pro Paredes MD;  Location: [facility] ENDOSCOPY;  Service:    • HYSTERECTOMY  1984   • INSERT / REPLACE / REMOVE PACEMAKER      insertion   • OOPHORECTOMY     • PACEMAKER IMPLANTATION     • TONSILLECTOMY         Cancer-related family history includes Breast cancer in her sister; Cancer in her brother.  Social History     Tobacco Use   • Smoking status: Never Smoker   • Smokeless  tobacco: Never Used   Substance Use Topics   • Alcohol use: No       MEDICATIONS:    Current Outpatient Medications:   •  apixaban (ELIQUIS) 5 MG tablet tablet, Take 5 mg by mouth 2 (Two) Times a Day., Disp: , Rfl:   •  atorvastatin (LIPITOR) 10 MG tablet, Take 1 tablet by mouth Daily., Disp: 90 tablet, Rfl: 0  •  bumetanide (BUMEX) 1 MG tablet, Take 0.5 mg by mouth Daily., Disp: , Rfl:   •  doxazosin (CARDURA) 2 MG tablet, TAKE ONE TABLET BY MOUTH ONCE NIGHTLY, Disp: 90 tablet, Rfl: 1  •  famotidine (PEPCID) 40 MG tablet, Take 1 tablet by mouth 2 (Two) Times a Day., Disp: 180 tablet, Rfl: 2  •  fluticasone (FLONASE) 50 MCG/ACT nasal spray, PLACE 2 SPRAYS IN EACH NOSTRIL DAILY, Disp: 48 g, Rfl: 2  •  irbesartan (AVAPRO) 300 MG tablet, Take 1 tablet by mouth Daily., Disp: 90 tablet, Rfl: 1  •  levothyroxine (SYNTHROID, LEVOTHROID) 150 MCG tablet, 1 tablet daily with 1/2 tablet on Mondays, Disp: 84 tablet, Rfl: 0  •  metoprolol succinate XL (TOPROL-XL) 100 MG 24 hr tablet, Take 1.5 tablets by mouth Daily., Disp: 135 tablet, Rfl: 0  •  timolol (TIMOPTIC) 0.5 % ophthalmic solution, Administer 1 drop to both eyes Daily., Disp: , Rfl:   •  vitamin B-12 (CYANOCOBALAMIN) 1000 MCG tablet, Take 1 tablet by mouth Daily., Disp: , Rfl:     ALLERGIES:  Allergies   Allergen Reactions   • Penicillin V Potassium Swelling     Throat swelled   • Latex Itching         Objective   VITAL SIGNS:     Not obtained-telehealth visit.    Wt Readings from Last 3 Encounters:   01/28/20 70.4 kg (155 lb 3.2 oz)   01/08/20 70.1 kg (154 lb 9.6 oz)   12/05/19 69.3 kg (152 lb 12.8 oz)       PHYSICAL EXAMINATION:  Not performed-telehealth visit     DIAGNOSTIC DATA:     Results from last 7 days   Lab Units 05/07/20  1114   WBC 10*3/mm3 6.05   NEUTROS ABS 10*3/mm3 3.22   HEMOGLOBIN g/dL 12.2   HEMATOCRIT % 38.6   PLATELETS 10*3/mm3 127*     Results from last 7 days   Lab Units 05/07/20  1114   SODIUM mmol/L 139   POTASSIUM mmol/L 4.1   CHLORIDE mmol/L  102   CO2 mmol/L 28.1   BUN mg/dL 29*   CREATININE mg/dL 1.01   CALCIUM mg/dL 10.3*   ALBUMIN g/dL 3.80  3.8   BILIRUBIN mg/dL 0.5   ALK PHOS U/L 60   ALT (SGPT) U/L 10   AST (SGOT) U/L 18   GLUCOSE mg/dL 75       Component      Latest Ref Rng & Units 11/18/2019 1/21/2020 5/7/2020   IgG      586 - 1602 mg/dL 3788 (H) 3762 (H) 3866 (H)   IgA      64 - 422 mg/dL 35 (L) 34 (L) 31 (L)   IgM      26 - 217 mg/dL 14 (L) 12 (L) 11 (L)   Total Protein      6.0 - 8.5 g/dL 8.1 8.4 8.5   Albumin      2.9 - 4.4 g/dL 3.6 3.7 3.8   Alpha-1-Globulin      0.0 - 0.4 g/dL 0.3 0.2 0.2   Alpha-2-Globulin      0.4 - 1.0 g/dL 0.7 0.7 0.7   Beta Globulin      0.7 - 1.3 g/dL 0.7 0.7 0.7   Gamma Globulin      0.4 - 1.8 g/dL 2.9 (H) 3.1 (H) 3.1 (H)   M-William      Not Observed g/dL 2.8 (H) 2.9 (H) 2.9 (H)   Globulin      2.2 - 3.9 g/dL 4.5 (H) 4.7 (H) 4.7 (H)   A/G Ratio      0.7 - 1.7 0.9 0.8 0.9   Immunofixation Reflex, Serum       Comment Comment Comment   Please note       Comment Comment Comment   Kappa FLC      3.3 - 19.4 mg/L 72.6 (H) 102.5 (H) 67.2 (H)   Free Lambda Light Chains      5.7 - 26.3 mg/L 10.4 8.9 7.1   Kappa/Lambda Ratio      0.26 - 1.65 6.98 (H) 11.52 (H) 9.46 (H)   Beta-2 Microglobulin      0.8 - 2.2 mg/L 12.0 (H) 13.1 (H) 7.4 (H)       Assessment/Plan   1.  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    · M protein increased to 2.7 on 3/19/2019.    · M protein decreased to 2.2 on 8/15/2019.    · M protein increased to 2.5 on 10/1/19.  · M protein increased to 2.8 on 11/18/2019  · Skeletal survey on 10/8/2019 showed no lytic lesions.  · B2M was 12 on 11/18/2019 and was down from 12.8 on 10/1/2019.  · Patient's kidney function improved compared to August 2019.  However, she continues to have hypercalcemia.  I told the patient I am concerned that this may represent progression to active multiple myeloma.  I recommended proceeding with bone marrow biopsy.  However, the patient wanted to wait until  she has her lung and heart issues taken care of.  · M protein increased to 2.9 on 1/21/2020.  B2 M increased to 13.1.  · M protein is currently stable at 2.9 g.  She is not anemic.  Kidney function is normal currently at 1.01.  She is not having any symptoms to suggest development of bone lesions.    2.  Anemia due to vitamin B12 deficiency.  She is on vitamin B12 1000 mcg daily.  Hemoglobin is currently normal.    3.  Hypercalcemia.  Calcium level increased to 10.3.  Patient states she has been eating yogurt regularly.    4.  Thrombocytopenia.  Platelet count is fluctuating between 120,000 and 150,000.  She is not having problems with bleeding.    PLAN:    1.  We will continue to monitor.  We will repeat CBC CMP SPEP JARED B2 M in 3 months.  2.  We discussed reducing intake of calcium rich foods and maintaining adequate hydration.    We will see the patient in follow-up in 3 months, 1 week after the above labs.    You have chosen to receive care through a telephone visit today. Do you consent to use a telephone visit for your medical care today? Yes     This visit has been rescheduled as a phone visit to comply with patient safety concerns in accordance with CDC recommendations. Total time of discussion was 8 minutes.       Hanna Santana MD  05/14/20

## 2020-05-18 RX ORDER — LEVOTHYROXINE SODIUM 0.15 MG/1
TABLET ORAL
Qty: 84 TABLET | Refills: 0 | Status: SHIPPED | OUTPATIENT
Start: 2020-05-18 | End: 2020-07-30 | Stop reason: SDUPTHER

## 2020-05-29 ENCOUNTER — OFFICE VISIT (OUTPATIENT)
Dept: FAMILY MEDICINE CLINIC | Facility: CLINIC | Age: 85
End: 2020-05-29

## 2020-05-29 VITALS
HEIGHT: 66 IN | TEMPERATURE: 97.1 F | BODY MASS INDEX: 25.23 KG/M2 | WEIGHT: 157 LBS | OXYGEN SATURATION: 98 % | DIASTOLIC BLOOD PRESSURE: 80 MMHG | SYSTOLIC BLOOD PRESSURE: 112 MMHG | HEART RATE: 67 BPM

## 2020-05-29 DIAGNOSIS — D47.2 IGG MONOCLONAL GAMMOPATHY: ICD-10-CM

## 2020-05-29 DIAGNOSIS — I48.19 OTHER PERSISTENT ATRIAL FIBRILLATION (HCC): ICD-10-CM

## 2020-05-29 DIAGNOSIS — E53.8 LOW VITAMIN B12 LEVEL: ICD-10-CM

## 2020-05-29 DIAGNOSIS — E03.9 ACQUIRED HYPOTHYROIDISM: ICD-10-CM

## 2020-05-29 DIAGNOSIS — R41.82 ALTERED MENTAL STATUS, UNSPECIFIED ALTERED MENTAL STATUS TYPE: ICD-10-CM

## 2020-05-29 DIAGNOSIS — I10 ESSENTIAL HYPERTENSION: Primary | ICD-10-CM

## 2020-05-29 DIAGNOSIS — E83.52 HYPERCALCEMIA: ICD-10-CM

## 2020-05-29 DIAGNOSIS — R53.83 FATIGUE, UNSPECIFIED TYPE: ICD-10-CM

## 2020-05-29 LAB
BILIRUB BLD-MCNC: NEGATIVE MG/DL
GLUCOSE UR STRIP-MCNC: NEGATIVE MG/DL
KETONES UR QL: NEGATIVE
LEUKOCYTE EST, POC: NEGATIVE
NITRITE UR-MCNC: NEGATIVE MG/ML
PH UR: 6 [PH] (ref 5–8)
PROT UR STRIP-MCNC: NEGATIVE MG/DL
RBC # UR STRIP: NEGATIVE /UL
SP GR UR: 1.02 (ref 1–1.03)
UROBILINOGEN UR QL: NORMAL

## 2020-05-29 PROCEDURE — 81003 URINALYSIS AUTO W/O SCOPE: CPT | Performed by: FAMILY MEDICINE

## 2020-05-29 PROCEDURE — 99213 OFFICE O/P EST LOW 20 MIN: CPT | Performed by: FAMILY MEDICINE

## 2020-05-29 RX ORDER — ATORVASTATIN CALCIUM 10 MG/1
TABLET, FILM COATED ORAL
Qty: 90 TABLET | Refills: 0 | Status: SHIPPED | OUTPATIENT
Start: 2020-05-29 | End: 2020-09-09

## 2020-05-29 NOTE — PROGRESS NOTES
Subjective   Isabell Angel is a 85 y.o. female.     Chief Complaint   Patient presents with   • Hypertension   • Hypothyroidism   • Hyperlipidemia        Patient presents for her usual blood pressure and thyroid check.  She does admit that a couple days ago she had a episode where she was a little confused.  Symptoms resolved completely.  She is oriented today.  She is up-to-date with her hematologist.  She has no complaints and is compliant with her medications.         The following portions of the patient's history were reviewed and updated as appropriate: allergies, current medications, past family history, past medical history, past social history, past surgical history and problem list.    Past Medical History:   Diagnosis Date   • Abdominal pain    • Abdominal wall strain    • Acid reflux    • Acute sinusitis    • Acute upper respiratory infection    • Allergic rhinitis, seasonal    • Anemia     iron deficiency anemia, resolved   • Aneurysm of ascending aorta (CMS/HCC)    • Anxiety disorder    • Aortic aneurysm (CMS/HCC)     MONITORED   • Arthritis    • Atrial fibrillation (CMS/HCC)    • Cataract    • Chronic periscapular pain on right side    • Chronic renal insufficiency    • Closed compression fracture of L1 vertebra (CMS/HCC)    • Colon distention    • Colon polyp    • Community acquired pneumonia    • Constipation    • Coronary artery disease    • Cough    • Cyst, bone    • Disease of thyroid gland    • DJD of shoulder    • Dyspnea    • Dysuria    • Edema    • Elevated brain natriuretic peptide (BNP) level    • Encounter for long-term (current) use of medications    • Esophageal reflux disease    • Fatigue    • Generalized abdominal pain    • Glaucoma    • Glenohumeral arthritis, right    • H/O gastroesophageal reflux (GERD)    • Hematuria    • History of iron deficiency anemia    • Hypercalcemia     Due to increased calcium intake in addition to calcium supplements   • Hypercholesterolemia    •  Hyperkalemia    • Hyperlipidemia    • Hyperpigmentation of skin of cheek    • Hypertension    • Hyponatremia    • Hypothyroidism    • IgG monoclonal gammopathy     IgG kappa type   • Incisional hernia    • Influenza    • Irregular heart beat     PACEMAKER   • Itching in the vaginal area    • Low vitamin B12 level    • Lumbago    • Memory change    • Monitoring for long-term anticoagulant use    • Nonvenomous insect bite    • Osteoarthritis of left glenohumeral joint    • Osteoarthritis of right glenohumeral joint    • Osteopenia    • Osteoporosis    • Paroxysmal atrial fibrillation (CMS/HCC)    • Periscapular pain    • Persistent atrial fibrillation    • Rash    • Right shoulder pain    • Shortness of breath    • Sick sinus syndrome (CMS/HCC)    • Sinus bradycardia    • Status post placement of cardiac pacemaker    • Tricuspid regurgitation    • Urinary body odor    • Urinary frequency    • Urinary tract infection    • Urinary urgency    • Viral enteritis    • Viral gastroenteritis    • Vitamin D deficiency    • Weakness    • Weakness of distal arms and legs        Past Surgical History:   Procedure Laterality Date   • ABDOMINAL SURGERY  09/18/2012    Pelvic abscess draining with appendectomy and resection and anastomosis of small intestine   • APPENDECTOMY  09/18/2012   • CARPAL TUNNEL RELEASE Bilateral    • CATARACT EXTRACTION     • CHOLECYSTECTOMY  08/31/2012   • COLON RESECTION SMALL BOWEL  09/2012    Removal of 1   foot of small bowel and abscess   • COLONOSCOPY N/A 4/13/2016    Procedure: COLONOSCOPY to cecum;  Surgeon: Pro Paredes MD;  Location: [facility] ENDOSCOPY;  Service:    • HYSTERECTOMY  1984   • INSERT / REPLACE / REMOVE PACEMAKER      insertion   • OOPHORECTOMY     • PACEMAKER IMPLANTATION     • TONSILLECTOMY         Family History   Problem Relation Age of Onset   • Cancer Brother    • Hypertension Mother    • Heart disease Father    • Hypertension Sister    • Breast cancer Sister    •  Arthritis Other    • Macular degeneration Other    • Heart disease Other    • Other Other         colitis       Social History     Socioeconomic History   • Marital status:      Spouse name: Not on file   • Number of children: Not on file   • Years of education: Not on file   • Highest education level: Not on file   Occupational History     Employer: RETIRED   Tobacco Use   • Smoking status: Never Smoker   • Smokeless tobacco: Never Used   Substance and Sexual Activity   • Alcohol use: No   • Drug use: No   • Sexual activity: Defer   Social History Narrative    The patient is a . She is a retired . She does not smoke or drink.         Current Outpatient Medications:   •  apixaban (ELIQUIS) 5 MG tablet tablet, Take 5 mg by mouth 2 (Two) Times a Day., Disp: , Rfl:   •  atorvastatin (LIPITOR) 10 MG tablet, TAKE ONE TABLET BY MOUTH DAILY, Disp: 90 tablet, Rfl: 0  •  bumetanide (BUMEX) 1 MG tablet, Take 0.5 mg by mouth Daily., Disp: , Rfl:   •  doxazosin (CARDURA) 2 MG tablet, TAKE ONE TABLET BY MOUTH ONCE NIGHTLY, Disp: 90 tablet, Rfl: 1  •  famotidine (PEPCID) 40 MG tablet, Take 1 tablet by mouth 2 (Two) Times a Day., Disp: 180 tablet, Rfl: 2  •  fluticasone (FLONASE) 50 MCG/ACT nasal spray, PLACE 2 SPRAYS IN EACH NOSTRIL DAILY, Disp: 48 g, Rfl: 2  •  irbesartan (AVAPRO) 300 MG tablet, Take 1 tablet by mouth Daily., Disp: 90 tablet, Rfl: 1  •  levothyroxine (SYNTHROID, LEVOTHROID) 150 MCG tablet, TAKE 1 TABLET BY MOUTH DAILY WITH 1/2 TABLET ON MONDAYS, Disp: 84 tablet, Rfl: 0  •  metoprolol succinate XL (TOPROL-XL) 100 MG 24 hr tablet, Take 1.5 tablets by mouth Daily., Disp: 135 tablet, Rfl: 0  •  timolol (TIMOPTIC) 0.5 % ophthalmic solution, Administer 1 drop to both eyes Daily., Disp: , Rfl:   •  vitamin B-12 (CYANOCOBALAMIN) 1000 MCG tablet, Take 1 tablet by mouth Daily., Disp: , Rfl:        Review of Systems   Constitutional: Negative for chills, fatigue and fever.   HENT: Negative for  "congestion, rhinorrhea and sore throat.    Respiratory: Negative for cough and shortness of breath.    Cardiovascular: Negative for chest pain and leg swelling.   Gastrointestinal: Negative for abdominal pain.   Endocrine: Negative for polydipsia and polyuria.   Genitourinary: Negative for dysuria.   Musculoskeletal: Negative for arthralgias and myalgias.   Skin: Negative for rash.   Neurological: Negative for dizziness.   Hematological: Does not bruise/bleed easily.   Psychiatric/Behavioral: Negative for sleep disturbance.       Objective   Vitals:    05/29/20 1354   BP: 112/80   Pulse: 67   Temp: 97.1 °F (36.2 °C)   SpO2: 98%   Weight: 71.2 kg (157 lb)   Height: 167 cm (65.75\")     Body mass index is 25.53 kg/m².  Physical Exam   Constitutional: She is oriented to person, place, and time. She appears well-developed and well-nourished.   HENT:   Head: Normocephalic and atraumatic.   Eyes: Pupils are equal, round, and reactive to light. Conjunctivae and EOM are normal.   Neck: Neck supple. No thyromegaly present.   Cardiovascular: Normal rate and regular rhythm.   No murmur heard.  Pulmonary/Chest: Effort normal and breath sounds normal. She has no wheezes.   Abdominal: Soft.   Musculoskeletal: Normal range of motion.   Neurological: She is alert and oriented to person, place, and time. No cranial nerve deficit.   Skin: Skin is warm and dry.   Psychiatric: She has a normal mood and affect.         Assessment/Plan   Isabell was seen today for hypertension, hypothyroidism and hyperlipidemia.    Diagnoses and all orders for this visit:    Essential hypertension  -     Basic Metabolic Panel    Other persistent atrial fibrillation    Acquired hypothyroidism  -     TSH    Fatigue, unspecified type    IgG monoclonal gammopathy    Hypercalcemia  -     Basic Metabolic Panel  -     Vitamin D 25 Hydroxy    Low vitamin B12 level  -     Vitamin B12    Altered mental status, unspecified altered mental status type  -     POC " Urinalysis Dipstick, Automated               There are no Patient Instructions on file for this visit.

## 2020-05-30 LAB
25(OH)D3+25(OH)D2 SERPL-MCNC: 58.3 NG/ML (ref 30–100)
BUN SERPL-MCNC: 35 MG/DL (ref 8–27)
BUN/CREAT SERPL: 33 (ref 12–28)
CALCIUM SERPL-MCNC: 9.7 MG/DL (ref 8.7–10.3)
CHLORIDE SERPL-SCNC: 100 MMOL/L (ref 96–106)
CO2 SERPL-SCNC: 24 MMOL/L (ref 20–29)
CREAT SERPL-MCNC: 1.07 MG/DL (ref 0.57–1)
GLUCOSE SERPL-MCNC: 97 MG/DL (ref 65–99)
POTASSIUM SERPL-SCNC: 4 MMOL/L (ref 3.5–5.2)
SODIUM SERPL-SCNC: 137 MMOL/L (ref 134–144)
TSH SERPL DL<=0.005 MIU/L-ACNC: 0.24 UIU/ML (ref 0.45–4.5)
VIT B12 SERPL-MCNC: 860 PG/ML (ref 232–1245)

## 2020-06-02 DIAGNOSIS — L40.9 PSORIASIS: Primary | ICD-10-CM

## 2020-06-08 RX ORDER — METOPROLOL SUCCINATE 100 MG/1
TABLET, EXTENDED RELEASE ORAL
Qty: 135 TABLET | Refills: 0 | Status: SHIPPED | OUTPATIENT
Start: 2020-06-08 | End: 2020-09-01

## 2020-07-24 RX ORDER — DOXAZOSIN 2 MG/1
TABLET ORAL
Qty: 90 TABLET | Refills: 1 | Status: SHIPPED | OUTPATIENT
Start: 2020-07-24 | End: 2020-12-02 | Stop reason: SDUPTHER

## 2020-07-30 RX ORDER — LEVOTHYROXINE SODIUM 0.15 MG/1
TABLET ORAL
Qty: 30 TABLET | Refills: 0 | Status: SHIPPED | OUTPATIENT
Start: 2020-07-30 | End: 2020-09-10 | Stop reason: SDUPTHER

## 2020-08-04 ENCOUNTER — OFFICE VISIT (OUTPATIENT)
Dept: FAMILY MEDICINE CLINIC | Facility: CLINIC | Age: 85
End: 2020-08-04

## 2020-08-04 VITALS
OXYGEN SATURATION: 99 % | DIASTOLIC BLOOD PRESSURE: 52 MMHG | BODY MASS INDEX: 23.85 KG/M2 | TEMPERATURE: 96.6 F | HEART RATE: 75 BPM | HEIGHT: 66 IN | WEIGHT: 148.4 LBS | SYSTOLIC BLOOD PRESSURE: 112 MMHG

## 2020-08-04 DIAGNOSIS — B35.4 DERMATOPHYTOSIS OF BODY: Primary | ICD-10-CM

## 2020-08-04 DIAGNOSIS — I83.813 VARICOSE VEINS OF BOTH LOWER EXTREMITIES WITH PAIN: ICD-10-CM

## 2020-08-04 PROCEDURE — 99213 OFFICE O/P EST LOW 20 MIN: CPT | Performed by: FAMILY MEDICINE

## 2020-08-04 NOTE — PROGRESS NOTES
Subjective   Isabell Angel is a 85 y.o. female.     Chief Complaint   Patient presents with   • Insect Bite     open sore on lower abdomen        Complains of noticing a bump in her lower belly that she thought was a bite breathing a couple days ago.  She denies any other symptoms.  Just been irritated.  She is been a little fatigued but has a regular checkup coming soon.  She has questions about her varicose veins.         The following portions of the patient's history were reviewed and updated as appropriate: allergies, current medications, past family history, past medical history, past social history, past surgical history and problem list.    Past Medical History:   Diagnosis Date   • Abdominal pain    • Abdominal wall strain    • Acid reflux    • Acute sinusitis    • Acute upper respiratory infection    • Allergic rhinitis, seasonal    • Anemia     iron deficiency anemia, resolved   • Aneurysm of ascending aorta (CMS/HCC)    • Anxiety disorder    • Aortic aneurysm (CMS/HCC)     MONITORED   • Arthritis    • Atrial fibrillation (CMS/HCC)    • Cataract    • Chronic periscapular pain on right side    • Chronic renal insufficiency    • Closed compression fracture of L1 vertebra (CMS/HCC)    • Colon distention    • Colon polyp    • Community acquired pneumonia    • Constipation    • Coronary artery disease    • Cough    • Cyst, bone    • Disease of thyroid gland    • DJD of shoulder    • Dyspnea    • Dysuria    • Edema    • Elevated brain natriuretic peptide (BNP) level    • Encounter for long-term (current) use of medications    • Esophageal reflux disease    • Fatigue    • Generalized abdominal pain    • Glaucoma    • Glenohumeral arthritis, right    • H/O gastroesophageal reflux (GERD)    • Hematuria    • History of iron deficiency anemia    • Hypercalcemia     Due to increased calcium intake in addition to calcium supplements   • Hypercholesterolemia    • Hyperkalemia    • Hyperlipidemia    •  Hyperpigmentation of skin of cheek    • Hypertension    • Hyponatremia    • Hypothyroidism    • IgG monoclonal gammopathy     IgG kappa type   • Incisional hernia    • Influenza    • Irregular heart beat     PACEMAKER   • Itching in the vaginal area    • Low vitamin B12 level    • Lumbago    • Memory change    • Monitoring for long-term anticoagulant use    • Nonvenomous insect bite    • Osteoarthritis of left glenohumeral joint    • Osteoarthritis of right glenohumeral joint    • Osteopenia    • Osteoporosis    • Paroxysmal atrial fibrillation (CMS/HCC)    • Periscapular pain    • Persistent atrial fibrillation (CMS/HCC)    • Rash    • Right shoulder pain    • Shortness of breath    • Sick sinus syndrome (CMS/HCC)    • Sinus bradycardia    • Status post placement of cardiac pacemaker    • Tricuspid regurgitation    • Urinary body odor    • Urinary frequency    • Urinary tract infection    • Urinary urgency    • Viral enteritis    • Viral gastroenteritis    • Vitamin D deficiency    • Weakness    • Weakness of distal arms and legs        Past Surgical History:   Procedure Laterality Date   • ABDOMINAL SURGERY  09/18/2012    Pelvic abscess draining with appendectomy and resection and anastomosis of small intestine   • APPENDECTOMY  09/18/2012   • CARPAL TUNNEL RELEASE Bilateral    • CATARACT EXTRACTION     • CHOLECYSTECTOMY  08/31/2012   • COLON RESECTION SMALL BOWEL  09/2012    Removal of 1   foot of small bowel and abscess   • COLONOSCOPY N/A 4/13/2016    Procedure: COLONOSCOPY to cecum;  Surgeon: Pro Paredes MD;  Location: [facility] ENDOSCOPY;  Service:    • HYSTERECTOMY  1984   • INSERT / REPLACE / REMOVE PACEMAKER      insertion   • OOPHORECTOMY     • PACEMAKER IMPLANTATION     • TONSILLECTOMY         Family History   Problem Relation Age of Onset   • Cancer Brother    • Hypertension Mother    • Heart disease Father    • Hypertension Sister    • Breast cancer Sister    • Arthritis Other    • Macular  degeneration Other    • Heart disease Other    • Other Other         colitis       Social History     Socioeconomic History   • Marital status:      Spouse name: Not on file   • Number of children: Not on file   • Years of education: Not on file   • Highest education level: Not on file   Occupational History     Employer: RETIRED   Tobacco Use   • Smoking status: Never Smoker   • Smokeless tobacco: Never Used   Substance and Sexual Activity   • Alcohol use: No   • Drug use: No   • Sexual activity: Defer   Social History Narrative    The patient is a . She is a retired . She does not smoke or drink.         Current Outpatient Medications:   •  apixaban (ELIQUIS) 5 MG tablet tablet, Take 5 mg by mouth 2 (Two) Times a Day., Disp: , Rfl:   •  atorvastatin (LIPITOR) 10 MG tablet, TAKE ONE TABLET BY MOUTH DAILY, Disp: 90 tablet, Rfl: 0  •  bumetanide (BUMEX) 1 MG tablet, Take 0.5 mg by mouth Daily., Disp: , Rfl:   •  doxazosin (CARDURA) 2 MG tablet, TAKE ONE TABLET BY MOUTH ONCE NIGHTLY, Disp: 90 tablet, Rfl: 1  •  famotidine (PEPCID) 40 MG tablet, Take 1 tablet by mouth 2 (Two) Times a Day., Disp: 180 tablet, Rfl: 2  •  fluticasone (FLONASE) 50 MCG/ACT nasal spray, PLACE 2 SPRAYS IN EACH NOSTRIL DAILY, Disp: 48 g, Rfl: 2  •  irbesartan (AVAPRO) 300 MG tablet, Take 1 tablet by mouth Daily., Disp: 90 tablet, Rfl: 1  •  levothyroxine (SYNTHROID, LEVOTHROID) 150 MCG tablet, TAKE 1 TABLET BY MOUTH DAILY WITH 1/2 TABLET ON MONDAYS, Disp: 30 tablet, Rfl: 0  •  metoprolol succinate XL (TOPROL-XL) 100 MG 24 hr tablet, TAKE ONE AND ONE-HALF TABLET BY MOUTH DAILY, Disp: 135 tablet, Rfl: 0  •  timolol (TIMOPTIC) 0.5 % ophthalmic solution, Administer 1 drop to both eyes Daily., Disp: , Rfl:   •  vitamin B-12 (CYANOCOBALAMIN) 1000 MCG tablet, Take 1 tablet by mouth Daily., Disp: , Rfl:   •  econazole nitrate (SPECTAZOLE) 1 % cream, Apply  topically to the appropriate area as directed 2 (Two) Times a Day., Disp:  "15 g, Rfl: 0    Review of Systems   Constitutional: Positive for fatigue. Negative for chills and fever.   HENT: Negative for congestion, rhinorrhea and sore throat.    Respiratory: Negative for cough and shortness of breath.    Cardiovascular: Negative for chest pain and leg swelling.   Gastrointestinal: Negative for abdominal pain.   Endocrine: Negative for polydipsia and polyuria.   Genitourinary: Negative for dysuria.   Musculoskeletal: Negative for arthralgias and myalgias.   Skin: Positive for rash.   Neurological: Negative for dizziness.   Hematological: Does not bruise/bleed easily.   Psychiatric/Behavioral: Negative for sleep disturbance.       Objective   Vitals:    08/04/20 1426   BP: 112/52   Pulse: 75   Temp: 96.6 °F (35.9 °C)   SpO2: 99%   Weight: 67.3 kg (148 lb 6.4 oz)   Height: 167 cm (65.75\")     Body mass index is 24.13 kg/m².  Physical Exam   Constitutional: She is oriented to person, place, and time. She appears well-developed and well-nourished. No distress.   HENT:   Head: Normocephalic and atraumatic.   Eyes: Pupils are equal, round, and reactive to light. Conjunctivae are normal.   Pulmonary/Chest: Effort normal. No respiratory distress.   Neurological: She is alert and oriented to person, place, and time.   Skin:   2 centimeter erythematous plaque and scar lower abdomen below pannus with redness and satellite lesions.   Psychiatric: She has a normal mood and affect.         Assessment/Plan   Isabell was seen today for insect bite.    Diagnoses and all orders for this visit:    Dermatophytosis of body  -     econazole nitrate (SPECTAZOLE) 1 % cream; Apply  topically to the appropriate area as directed 2 (Two) Times a Day.    Varicose veins of both lower extremities with pain               Patient Instructions   Log blood pressure until next check up.  Will recheck rash at next appointment.     "

## 2020-08-06 ENCOUNTER — LAB (OUTPATIENT)
Dept: LAB | Facility: HOSPITAL | Age: 85
End: 2020-08-06

## 2020-08-06 DIAGNOSIS — D47.2 IGG MONOCLONAL GAMMOPATHY: ICD-10-CM

## 2020-08-06 DIAGNOSIS — E83.52 HYPERCALCEMIA: ICD-10-CM

## 2020-08-06 DIAGNOSIS — D69.6 THROMBOCYTOPENIA (HCC): ICD-10-CM

## 2020-08-06 LAB
ALBUMIN SERPL-MCNC: 3.6 G/DL (ref 3.5–5.2)
ALBUMIN/GLOB SERPL: 0.7 G/DL (ref 1.1–2.4)
ALP SERPL-CCNC: 53 U/L (ref 38–116)
ALT SERPL W P-5'-P-CCNC: 9 U/L (ref 0–33)
ANION GAP SERPL CALCULATED.3IONS-SCNC: 8.3 MMOL/L (ref 5–15)
AST SERPL-CCNC: 16 U/L (ref 0–32)
B2 MICROGLOB SERPL-MCNC: 14.7 MG/L (ref 0.8–2.2)
BASOPHILS # BLD AUTO: 0 10*3/MM3 (ref 0–0.2)
BASOPHILS NFR BLD AUTO: 0 % (ref 0–1.5)
BILIRUB SERPL-MCNC: 0.6 MG/DL (ref 0.2–1.2)
BUN SERPL-MCNC: 37 MG/DL (ref 6–20)
BUN/CREAT SERPL: 32.5 (ref 7.3–30)
CALCIUM SPEC-SCNC: 9.8 MG/DL (ref 8.5–10.2)
CHLORIDE SERPL-SCNC: 104 MMOL/L (ref 98–107)
CO2 SERPL-SCNC: 23.7 MMOL/L (ref 22–29)
CREAT SERPL-MCNC: 1.14 MG/DL (ref 0.6–1.1)
DEPRECATED RDW RBC AUTO: 50.5 FL (ref 37–54)
EOSINOPHIL # BLD AUTO: 0.04 10*3/MM3 (ref 0–0.4)
EOSINOPHIL NFR BLD AUTO: 0.9 % (ref 0.3–6.2)
ERYTHROCYTE [DISTWIDTH] IN BLOOD BY AUTOMATED COUNT: 13.8 % (ref 12.3–15.4)
GFR SERPL CREATININE-BSD FRML MDRD: 45 ML/MIN/1.73
GLOBULIN UR ELPH-MCNC: 5.2 GM/DL (ref 1.8–3.5)
GLUCOSE SERPL-MCNC: 96 MG/DL (ref 74–124)
HCT VFR BLD AUTO: 34.6 % (ref 34–46.6)
HGB BLD-MCNC: 11.2 G/DL (ref 12–15.9)
IMM GRANULOCYTES # BLD AUTO: 0.01 10*3/MM3 (ref 0–0.05)
IMM GRANULOCYTES NFR BLD AUTO: 0.2 % (ref 0–0.5)
LYMPHOCYTES # BLD AUTO: 1.96 10*3/MM3 (ref 0.7–3.1)
LYMPHOCYTES NFR BLD AUTO: 45.6 % (ref 19.6–45.3)
MCH RBC QN AUTO: 32.7 PG (ref 26.6–33)
MCHC RBC AUTO-ENTMCNC: 32.4 G/DL (ref 31.5–35.7)
MCV RBC AUTO: 100.9 FL (ref 79–97)
MONOCYTES # BLD AUTO: 0.63 10*3/MM3 (ref 0.1–0.9)
MONOCYTES NFR BLD AUTO: 14.7 % (ref 5–12)
NEUTROPHILS NFR BLD AUTO: 1.66 10*3/MM3 (ref 1.7–7)
NEUTROPHILS NFR BLD AUTO: 38.6 % (ref 42.7–76)
NRBC BLD AUTO-RTO: 0 /100 WBC (ref 0–0.2)
PLATELET # BLD AUTO: 132 10*3/MM3 (ref 140–450)
PMV BLD AUTO: 9.2 FL (ref 6–12)
POTASSIUM SERPL-SCNC: 4.1 MMOL/L (ref 3.5–4.7)
PROT SERPL-MCNC: 8.8 G/DL (ref 6.3–8)
RBC # BLD AUTO: 3.43 10*6/MM3 (ref 3.77–5.28)
SODIUM SERPL-SCNC: 136 MMOL/L (ref 134–145)
WBC # BLD AUTO: 4.3 10*3/MM3 (ref 3.4–10.8)

## 2020-08-06 PROCEDURE — 80053 COMPREHEN METABOLIC PANEL: CPT

## 2020-08-06 PROCEDURE — 85025 COMPLETE CBC W/AUTO DIFF WBC: CPT

## 2020-08-06 PROCEDURE — 36415 COLL VENOUS BLD VENIPUNCTURE: CPT

## 2020-08-06 PROCEDURE — 82232 ASSAY OF BETA-2 PROTEIN: CPT | Performed by: INTERNAL MEDICINE

## 2020-08-07 LAB
ALBUMIN SERPL-MCNC: 3.6 G/DL (ref 2.9–4.4)
ALBUMIN/GLOB SERPL: 0.8 {RATIO} (ref 0.7–1.7)
ALPHA1 GLOB FLD ELPH-MCNC: 0.3 G/DL (ref 0–0.4)
ALPHA2 GLOB SERPL ELPH-MCNC: 0.7 G/DL (ref 0.4–1)
B-GLOBULIN SERPL ELPH-MCNC: 0.6 G/DL (ref 0.7–1.3)
GAMMA GLOB SERPL ELPH-MCNC: 3.1 G/DL (ref 0.4–1.8)
GLOBULIN SER CALC-MCNC: 4.7 G/DL (ref 2.2–3.9)
IGA SERPL-MCNC: 25 MG/DL (ref 64–422)
IGG SERPL-MCNC: 3586 MG/DL (ref 586–1602)
IGM SERPL-MCNC: 13 MG/DL (ref 26–217)
INTERPRETATION SERPL IEP-IMP: ABNORMAL
KAPPA LC SERPL-MCNC: 122.7 MG/L (ref 3.3–19.4)
KAPPA LC/LAMBDA SER: 15.34 {RATIO} (ref 0.26–1.65)
LAMBDA LC FREE SERPL-MCNC: 8 MG/L (ref 5.7–26.3)
Lab: ABNORMAL
M-SPIKE: 3 G/DL
PROT SERPL-MCNC: 8.3 G/DL (ref 6–8.5)

## 2020-08-13 ENCOUNTER — APPOINTMENT (OUTPATIENT)
Dept: LAB | Facility: HOSPITAL | Age: 85
End: 2020-08-13

## 2020-08-13 ENCOUNTER — OFFICE VISIT (OUTPATIENT)
Dept: ONCOLOGY | Facility: CLINIC | Age: 85
End: 2020-08-13

## 2020-08-13 VITALS
HEART RATE: 80 BPM | WEIGHT: 149.6 LBS | SYSTOLIC BLOOD PRESSURE: 128 MMHG | TEMPERATURE: 97.5 F | RESPIRATION RATE: 16 BRPM | HEIGHT: 66 IN | DIASTOLIC BLOOD PRESSURE: 82 MMHG | BODY MASS INDEX: 24.04 KG/M2 | OXYGEN SATURATION: 98 %

## 2020-08-13 DIAGNOSIS — E83.52 HYPERCALCEMIA: ICD-10-CM

## 2020-08-13 DIAGNOSIS — D51.9 ANEMIA DUE TO VITAMIN B12 DEFICIENCY, UNSPECIFIED B12 DEFICIENCY TYPE: ICD-10-CM

## 2020-08-13 DIAGNOSIS — D69.6 THROMBOCYTOPENIA (HCC): ICD-10-CM

## 2020-08-13 DIAGNOSIS — D47.2 IGG MONOCLONAL GAMMOPATHY: Primary | ICD-10-CM

## 2020-08-13 DIAGNOSIS — N17.9 ACUTE KIDNEY INJURY (HCC): ICD-10-CM

## 2020-08-13 DIAGNOSIS — D50.9 IRON DEFICIENCY ANEMIA, UNSPECIFIED IRON DEFICIENCY ANEMIA TYPE: ICD-10-CM

## 2020-08-13 PROCEDURE — 99214 OFFICE O/P EST MOD 30 MIN: CPT | Performed by: INTERNAL MEDICINE

## 2020-08-13 RX ORDER — FERROUS SULFATE 325(65) MG
325 TABLET ORAL DAILY
Start: 2020-08-13 | End: 2020-08-31

## 2020-08-13 NOTE — PROGRESS NOTES
Subjective     CHIEF COMPLAINT:      Chief Complaint   Patient presents with   • Follow-up     concern about fatigue and discuss prednisone       HISTORY OF PRESENT ILLNESS:     Isabell Angel is a 85 y.o. female patient who returns today for follow up on her monoclonal gammopathy and anemia.  She is on vitamin B12 1000 mcg daily.  She is on Bumex half a tablet daily.  She is not having lower extremity swelling.  She denies shortness of breath but she reports fatigue.    She denies having new areas of pain in her back or extremities.    REVIEW OF SYSTEMS:  Review of Systems   Constitutional: Positive for fatigue. Negative for fever and unexpected weight change.   HENT: Negative for nosebleeds and voice change.    Eyes: Negative for visual disturbance.   Respiratory: Negative for cough and shortness of breath.    Cardiovascular: Negative for chest pain and leg swelling.   Gastrointestinal: Positive for diarrhea. Negative for abdominal pain, blood in stool, constipation, nausea and vomiting.   Genitourinary: Negative for frequency and hematuria.   Musculoskeletal: Negative for back pain and joint swelling.   Skin: Negative for rash.   Neurological: Negative for dizziness and headaches.   Hematological: Negative for adenopathy. Does not bruise/bleed easily.   Psychiatric/Behavioral: Negative for dysphoric mood. The patient is not nervous/anxious.      I reviewed and verified the CC and ROS obtained by the MA.     Past Medical History:   Diagnosis Date   • Abdominal pain    • Abdominal wall strain    • Acid reflux    • Acute sinusitis    • Acute upper respiratory infection    • Allergic rhinitis, seasonal    • Anemia     iron deficiency anemia, resolved   • Aneurysm of ascending aorta (CMS/HCC)    • Anxiety disorder    • Aortic aneurysm (CMS/HCC)     MONITORED   • Arthritis    • Atrial fibrillation (CMS/HCC)    • Cataract    • Chronic periscapular pain on right side    • Chronic renal insufficiency    • Closed  compression fracture of L1 vertebra (CMS/HCC)    • Colon distention    • Colon polyp    • Community acquired pneumonia    • Constipation    • Coronary artery disease    • Cough    • Cyst, bone    • Disease of thyroid gland    • DJD of shoulder    • Dyspnea    • Dysuria    • Edema    • Elevated brain natriuretic peptide (BNP) level    • Encounter for long-term (current) use of medications    • Esophageal reflux disease    • Fatigue    • Generalized abdominal pain    • Glaucoma    • Glenohumeral arthritis, right    • H/O gastroesophageal reflux (GERD)    • Hematuria    • History of iron deficiency anemia    • Hypercalcemia     Due to increased calcium intake in addition to calcium supplements   • Hypercholesterolemia    • Hyperkalemia    • Hyperlipidemia    • Hyperpigmentation of skin of cheek    • Hypertension    • Hyponatremia    • Hypothyroidism    • IgG monoclonal gammopathy     IgG kappa type   • Incisional hernia    • Influenza    • Irregular heart beat     PACEMAKER   • Itching in the vaginal area    • Low vitamin B12 level    • Lumbago    • Memory change    • Monitoring for long-term anticoagulant use    • Nonvenomous insect bite    • Osteoarthritis of left glenohumeral joint    • Osteoarthritis of right glenohumeral joint    • Osteopenia    • Osteoporosis    • Paroxysmal atrial fibrillation (CMS/HCC)    • Periscapular pain    • Persistent atrial fibrillation (CMS/HCC)    • Rash    • Right shoulder pain    • Shortness of breath    • Sick sinus syndrome (CMS/HCC)    • Sinus bradycardia    • Status post placement of cardiac pacemaker    • Tricuspid regurgitation    • Urinary body odor    • Urinary frequency    • Urinary tract infection    • Urinary urgency    • Viral enteritis    • Viral gastroenteritis    • Vitamin D deficiency    • Weakness    • Weakness of distal arms and legs        Past Surgical History:   Procedure Laterality Date   • ABDOMINAL SURGERY  09/18/2012    Pelvic abscess draining with  appendectomy and resection and anastomosis of small intestine   • APPENDECTOMY  09/18/2012   • CARPAL TUNNEL RELEASE Bilateral    • CATARACT EXTRACTION     • CHOLECYSTECTOMY  08/31/2012   • COLON RESECTION SMALL BOWEL  09/2012    Removal of 1   foot of small bowel and abscess   • COLONOSCOPY N/A 4/13/2016    Procedure: COLONOSCOPY to cecum;  Surgeon: Pro Paredes MD;  Location: [facility] ENDOSCOPY;  Service:    • HYSTERECTOMY  1984   • INSERT / REPLACE / REMOVE PACEMAKER      insertion   • OOPHORECTOMY     • PACEMAKER IMPLANTATION     • TONSILLECTOMY         Cancer-related family history includes Breast cancer in her sister; Cancer in her brother.  Social History     Tobacco Use   • Smoking status: Never Smoker   • Smokeless tobacco: Never Used   Substance Use Topics   • Alcohol use: No       MEDICATIONS:    Current Outpatient Medications:   •  apixaban (ELIQUIS) 5 MG tablet tablet, Take 5 mg by mouth 2 (Two) Times a Day., Disp: , Rfl:   •  atorvastatin (LIPITOR) 10 MG tablet, TAKE ONE TABLET BY MOUTH DAILY, Disp: 90 tablet, Rfl: 0  •  bumetanide (BUMEX) 1 MG tablet, Take 0.5 mg by mouth Daily., Disp: , Rfl:   •  doxazosin (CARDURA) 2 MG tablet, TAKE ONE TABLET BY MOUTH ONCE NIGHTLY, Disp: 90 tablet, Rfl: 1  •  econazole nitrate (SPECTAZOLE) 1 % cream, Apply  topically to the appropriate area as directed 2 (Two) Times a Day., Disp: 15 g, Rfl: 0  •  famotidine (PEPCID) 40 MG tablet, Take 1 tablet by mouth 2 (Two) Times a Day., Disp: 180 tablet, Rfl: 2  •  fluticasone (FLONASE) 50 MCG/ACT nasal spray, PLACE 2 SPRAYS IN EACH NOSTRIL DAILY, Disp: 48 g, Rfl: 2  •  irbesartan (AVAPRO) 300 MG tablet, Take 1 tablet by mouth Daily., Disp: 90 tablet, Rfl: 1  •  levothyroxine (SYNTHROID, LEVOTHROID) 150 MCG tablet, TAKE 1 TABLET BY MOUTH DAILY WITH 1/2 TABLET ON MONDAYS, Disp: 30 tablet, Rfl: 0  •  metoprolol succinate XL (TOPROL-XL) 100 MG 24 hr tablet, TAKE ONE AND ONE-HALF TABLET BY MOUTH DAILY, Disp: 135 tablet,  "Rfl: 0  •  timolol (TIMOPTIC) 0.5 % ophthalmic solution, Administer 1 drop to both eyes Daily., Disp: , Rfl:   •  vitamin B-12 (CYANOCOBALAMIN) 1000 MCG tablet, Take 1 tablet by mouth Daily., Disp: , Rfl:     ALLERGIES:  Allergies   Allergen Reactions   • Penicillin V Potassium Swelling     Throat swelled   • Latex Itching         Objective   VITAL SIGNS:     Vitals:    08/13/20 1302   BP: 128/82   Pulse: 80   Resp: 16   Temp: 97.5 °F (36.4 °C)   SpO2: 98%   Weight: 67.9 kg (149 lb 9.6 oz)   Height: 167 cm (65.75\")   PainSc: 0-No pain     Body mass index is 24.33 kg/m².     Wt Readings from Last 3 Encounters:   08/13/20 67.9 kg (149 lb 9.6 oz)   08/04/20 67.3 kg (148 lb 6.4 oz)   05/29/20 71.2 kg (157 lb)       PHYSICAL EXAMINATION:  GENERAL:  The patient appears in fair general condition, not in acute distress.  SKIN: No skin rashes. No ecchymosis.  HEAD:  Normocephalic.  EYES:  No Jaundice. No Pallor. Pupils equal. EOMI.  NECK:  Supple with Good ROM. No Thyromegaly. No Masses.  LYMPHATICS:  No cervical or supraclavicular lymphadenopathy.  CHEST: Normal respiratory effort. Lungs clear to auscultation.   CARDIAC:  Normal S1 & S2. No murmur. No edema.  ABDOMEN: Non-distended.  EXTREMITIES:  No clubbing. No deforming arthritis in the hands. No Calf tenderness.  NEUROLOGICAL:  No Focal neurological deficits.       DIAGNOSTIC DATA:     Component      Latest Ref Rng & Units 1/21/2020 5/7/2020 8/6/2020   WBC      3.40 - 10.80 10*3/mm3 4.67 6.05 4.30   RBC      3.77 - 5.28 10*6/mm3 3.67 (L) 3.68 (L) 3.43 (L)   Hemoglobin      12.0 - 15.9 g/dL 12.3 12.2 11.2 (L)   Hematocrit      34.0 - 46.6 % 37.8 38.6 34.6   MCV      79.0 - 97.0 fL 103.0 (H) 104.9 (H) 100.9 (H)   MCH      26.6 - 33.0 pg 33.5 (H) 33.2 (H) 32.7   MCHC      31.5 - 35.7 g/dL 32.5 31.6 32.4   RDW      12.3 - 15.4 % 14.1 13.5 13.8   RDW-SD      37.0 - 54.0 fl 53.5 53.0 50.5   MPV      6.0 - 12.0 fL 9.9 9.5 9.2   Platelets      140 - 450 10*3/mm3 148 127 (L) 132 " (L)   Neutrophil Rel %      42.7 - 76.0 % 45.4 53.1 38.6 (L)   Lymphocyte Rel %      19.6 - 45.3 % 37.3 32.6 45.6 (H)   Monocyte Rel %      5.0 - 12.0 % 15.8 (H) 12.7 (H) 14.7 (H)   Eosinophil Rel %      0.3 - 6.2 % 0.9 1.2 0.9   Basophil Rel %      0.0 - 1.5 % 0.4 0.2 0.0   Immature Granulocyte Rel %      0.0 - 0.5 % 0.2 0.2 0.2   Neutrophils Absolute      1.70 - 7.00 10*3/mm3 2.12 3.22 1.66 (L)   Lymphocytes Absolute      0.70 - 3.10 10*3/mm3 1.74 1.97 1.96   Monocytes Absolute      0.10 - 0.90 10*3/mm3 0.74 0.77 0.63   Eosinophils Absolute      0.00 - 0.40 10*3/mm3 0.04 0.07 0.04   Basophils Absolute      0.00 - 0.20 10*3/mm3 0.02 0.01 0.00   Immature Grans, Absolute      0.00 - 0.05 10*3/mm3 0.01 0.01 0.01   nRBC      0.0 - 0.2 /100 WBC 0.0 0.0 0.0   Glucose      74 - 124 mg/dL 91 75 96   BUN      6 - 20 mg/dL 30 (H) 29 (H) 37 (H)   Creatinine      0.60 - 1.10 mg/dL 1.08 1.01 1.14 (H)   Sodium      134 - 145 mmol/L 137 139 136   Potassium      3.5 - 4.7 mmol/L 4.3 4.1 4.1   Chloride      98 - 107 mmol/L 99 102 104   CO2      22.0 - 29.0 mmol/L 27.2 28.1 23.7   Calcium      8.5 - 10.2 mg/dL 10.0 10.3 (H) 9.8   Total Protein      6.3 - 8.0 g/dL 8.8 (H) 8.9 (H) 8.8 (H)   Albumin      3.50 - 5.20 g/dL 3.90 3.80 3.60   ALT (SGPT)      0 - 33 U/L 10 10 9   AST (SGOT)      0 - 32 U/L 20 18 16   Alkaline Phosphatase      38 - 116 U/L 56 60 53   Total Bilirubin      0.2 - 1.2 mg/dL 0.5 0.5 0.6   eGFR Non African Am      >60 mL/min/1.73 48 (L) 52 (L) 45 (L)   Globulin      1.8 - 3.5 gm/dL 4.9 (H) 5.1 (H) 5.2 (H)   A/G Ratio      1.1 - 2.4 g/dL 0.8 (L) 0.7 (L) 0.7 (L)   BUN/Creatinine Ratio      7.3 - 30.0 27.8 28.7 32.5 (H)   Anion Gap      5.0 - 15.0 mmol/L 10.8 8.9 8.3       Component      Latest Ref Rng & Units 11/18/2019 1/21/2020 5/7/2020 8/6/2020   Beta-2 Microglobulin      0.8 - 2.2 mg/L 12.0 (H) 13.1 (H) 7.4 (H) 14.7 (H)       Component      Latest Ref Rng & Units 11/18/2019 1/21/2020 5/7/2020 8/6/2020   IgG       586 - 1602 mg/dL 3788 (H) 3762 (H) 3866 (H) 3586 (H)   IgA      64 - 422 mg/dL 35 (L) 34 (L) 31 (L) 25 (L)   IgM      26 - 217 mg/dL 14 (L) 12 (L) 11 (L) 13 (L)   Total Protein      6.0 - 8.5 g/dL 8.1 8.4 8.5 8.3   Albumin      2.9 - 4.4 g/dL 3.6 3.7 3.8 3.6   Alpha-1-Globulin      0.0 - 0.4 g/dL 0.3 0.2 0.2 0.3   Alpha-2-Globulin      0.4 - 1.0 g/dL 0.7 0.7 0.7 0.7   Beta Globulin      0.7 - 1.3 g/dL 0.7 0.7 0.7 0.6 (L)   Gamma Globulin      0.4 - 1.8 g/dL 2.9 (H) 3.1 (H) 3.1 (H) 3.1 (H)   M-William      Not Observed g/dL 2.8 (H) 2.9 (H) 2.9 (H) 3.0 (H)   Globulin      2.2 - 3.9 g/dL 4.5 (H) 4.7 (H) 4.7 (H) 4.7 (H)   A/G Ratio      0.7 - 1.7 0.9 0.8 0.9 0.8   Immunofixation Reflex, Serum       Comment Comment Comment Comment   Please note       Comment Comment Comment Comment   Kappa FLC      3.3 - 19.4 mg/L 72.6 (H) 102.5 (H) 67.2 (H) 122.7 (H)   Free Lambda Light Chains      5.7 - 26.3 mg/L 10.4 8.9 7.1 8.0   Kappa/Lambda Ratio      0.26 - 1.65 6.98 (H) 11.52 (H) 9.46 (H) 15.34 (H)         Assessment/Plan   1.  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    · M protein increased to 2.7 on 3/19/2019.    · M protein decreased to 2.2 on 8/15/2019.    · M protein increased to 2.5 on 10/1/19.  · M protein increased to 2.8 on 11/18/2019  · Skeletal survey on 10/8/2019 showed no lytic lesions.  · B2M was 12 on 11/18/2019 and was down from 12.8 on 10/1/2019.  · Patient's kidney function improved compared to August 2019.  However, she continues to have hypercalcemia.  I told the patient I am concerned that this may represent progression to active multiple myeloma.  I recommended proceeding with bone marrow biopsy.  However, the patient wanted to wait until she has her lung and heart issues taken care of.  · M protein increased to 2.9 on 1/21/2020.  B2 M increased to 13.1.  · M protein increased to 3.0 on 8/6/2020.  Patient developed anemia and kidney function worsened.  This was concerning for  possible transformation to active multiple myeloma.  · We discussed the option of proceeding with a bone marrow biopsy at this point versus continued monitoring.  Patient prefers to continue to monitor.  We will therefore repeat paraprotein studies in 2 months.     2.  Anemia due to vitamin B12 deficiency.  She is on vitamin B12 1000 mcg daily.  Hemoglobin level has decreased to 11.2 on 8/6/2020.    3.  Hypercalcemia.  Calcium level increased to 10.3.  Patient noticed that there is calcium in her vitamin and she stopped it.  Calcium improved to 9.8.    4.  Thrombocytopenia.  Platelets are currently at 132,000.  She is not having problem with bleeding..    PLAN:    1.  Start ferrous sulfate 325 mg a day.  2.  Continue B12 1000 mcg daily.  3.  Increase fluid intake.  4.  Return for follow-up in 2 months with repeat CBC CMP ferritin iron panel B12 SPEP JARED 1 week before her return visit.      Hanna Santana MD  08/13/20

## 2020-08-31 ENCOUNTER — OFFICE VISIT (OUTPATIENT)
Dept: FAMILY MEDICINE CLINIC | Facility: CLINIC | Age: 85
End: 2020-08-31

## 2020-08-31 VITALS
WEIGHT: 151.6 LBS | HEIGHT: 66 IN | OXYGEN SATURATION: 98 % | DIASTOLIC BLOOD PRESSURE: 62 MMHG | SYSTOLIC BLOOD PRESSURE: 134 MMHG | HEART RATE: 80 BPM | TEMPERATURE: 97.1 F | BODY MASS INDEX: 24.36 KG/M2

## 2020-08-31 DIAGNOSIS — I10 ESSENTIAL HYPERTENSION: Primary | ICD-10-CM

## 2020-08-31 DIAGNOSIS — E03.9 ACQUIRED HYPOTHYROIDISM: ICD-10-CM

## 2020-08-31 DIAGNOSIS — M54.50 CHRONIC MIDLINE LOW BACK PAIN WITHOUT SCIATICA: ICD-10-CM

## 2020-08-31 DIAGNOSIS — S32.010S COMPRESSION FRACTURE OF L1 VERTEBRA, SEQUELA: ICD-10-CM

## 2020-08-31 DIAGNOSIS — I48.19 OTHER PERSISTENT ATRIAL FIBRILLATION (HCC): ICD-10-CM

## 2020-08-31 DIAGNOSIS — Z91.81 AT MODERATE RISK FOR FALL: ICD-10-CM

## 2020-08-31 DIAGNOSIS — R53.83 FATIGUE, UNSPECIFIED TYPE: ICD-10-CM

## 2020-08-31 DIAGNOSIS — G89.29 CHRONIC MIDLINE LOW BACK PAIN WITHOUT SCIATICA: ICD-10-CM

## 2020-08-31 DIAGNOSIS — D64.9 ANEMIA, UNSPECIFIED TYPE: ICD-10-CM

## 2020-08-31 DIAGNOSIS — E53.8 LOW VITAMIN B12 LEVEL: ICD-10-CM

## 2020-08-31 DIAGNOSIS — Z78.0 ASYMPTOMATIC MENOPAUSAL STATE: ICD-10-CM

## 2020-08-31 PROCEDURE — G0439 PPPS, SUBSEQ VISIT: HCPCS | Performed by: FAMILY MEDICINE

## 2020-08-31 PROCEDURE — 96160 PT-FOCUSED HLTH RISK ASSMT: CPT | Performed by: FAMILY MEDICINE

## 2020-08-31 RX ORDER — IRBESARTAN 300 MG/1
TABLET ORAL
Qty: 90 TABLET | Refills: 0 | Status: SHIPPED | OUTPATIENT
Start: 2020-08-31 | End: 2020-12-02 | Stop reason: SDUPTHER

## 2020-09-01 LAB
ALBUMIN SERPL-MCNC: 3.6 G/DL (ref 3.6–4.6)
ALBUMIN/GLOB SERPL: 0.8 {RATIO} (ref 1.2–2.2)
ALP SERPL-CCNC: 59 IU/L (ref 39–117)
ALT SERPL-CCNC: 11 IU/L (ref 0–32)
AST SERPL-CCNC: 13 IU/L (ref 0–40)
BASOPHILS # BLD AUTO: 0 X10E3/UL (ref 0–0.2)
BASOPHILS NFR BLD AUTO: 0 %
BILIRUB SERPL-MCNC: 0.5 MG/DL (ref 0–1.2)
BUN SERPL-MCNC: 29 MG/DL (ref 8–27)
BUN/CREAT SERPL: 25 (ref 12–28)
CALCIUM SERPL-MCNC: 9.5 MG/DL (ref 8.7–10.3)
CHLORIDE SERPL-SCNC: 101 MMOL/L (ref 96–106)
CO2 SERPL-SCNC: 24 MMOL/L (ref 20–29)
CREAT SERPL-MCNC: 1.15 MG/DL (ref 0.57–1)
EOSINOPHIL # BLD AUTO: 0.1 X10E3/UL (ref 0–0.4)
EOSINOPHIL NFR BLD AUTO: 1 %
ERYTHROCYTE [DISTWIDTH] IN BLOOD BY AUTOMATED COUNT: 13.3 % (ref 11.7–15.4)
GLOBULIN SER CALC-MCNC: 4.7 G/DL (ref 1.5–4.5)
GLUCOSE SERPL-MCNC: ABNORMAL MG/DL
HCT VFR BLD AUTO: 33.2 % (ref 34–46.6)
HGB BLD-MCNC: 10.7 G/DL (ref 11.1–15.9)
IMM GRANULOCYTES # BLD AUTO: 0 X10E3/UL (ref 0–0.1)
IMM GRANULOCYTES NFR BLD AUTO: 0 %
LYMPHOCYTES # BLD AUTO: 2 X10E3/UL (ref 0.7–3.1)
LYMPHOCYTES NFR BLD AUTO: 40 %
MCH RBC QN AUTO: 32.1 PG (ref 26.6–33)
MCHC RBC AUTO-ENTMCNC: 32.2 G/DL (ref 31.5–35.7)
MCV RBC AUTO: 100 FL (ref 79–97)
MONOCYTES # BLD AUTO: 0.6 X10E3/UL (ref 0.1–0.9)
MONOCYTES NFR BLD AUTO: 13 %
NEUTROPHILS # BLD AUTO: 2.3 X10E3/UL (ref 1.4–7)
NEUTROPHILS NFR BLD AUTO: 46 %
PLATELET # BLD AUTO: 152 X10E3/UL (ref 150–450)
POTASSIUM SERPL-SCNC: ABNORMAL MMOL/L
PROT SERPL-MCNC: 8.3 G/DL (ref 6–8.5)
RBC # BLD AUTO: 3.33 X10E6/UL (ref 3.77–5.28)
SODIUM SERPL-SCNC: 137 MMOL/L (ref 134–144)
TSH SERPL DL<=0.005 MIU/L-ACNC: 0.03 UIU/ML (ref 0.45–4.5)
VIT B12 SERPL-MCNC: 666 PG/ML (ref 232–1245)
WBC # BLD AUTO: 5 X10E3/UL (ref 3.4–10.8)

## 2020-09-01 RX ORDER — METOPROLOL SUCCINATE 100 MG/1
TABLET, EXTENDED RELEASE ORAL
Qty: 135 TABLET | Refills: 3 | Status: SHIPPED | OUTPATIENT
Start: 2020-09-01 | End: 2020-12-02 | Stop reason: SDUPTHER

## 2020-09-02 LAB
FERRITIN SERPL-MCNC: 114 NG/ML (ref 15–150)
FOLATE SERPL-MCNC: 15.5 NG/ML
IRON SERPL-MCNC: 64 UG/DL (ref 27–139)
WRITTEN AUTHORIZATION: NORMAL

## 2020-09-09 DIAGNOSIS — Z91.81 AT MODERATE RISK FOR FALL: ICD-10-CM

## 2020-09-09 DIAGNOSIS — M54.50 CHRONIC MIDLINE LOW BACK PAIN WITHOUT SCIATICA: ICD-10-CM

## 2020-09-09 DIAGNOSIS — G89.29 CHRONIC MIDLINE LOW BACK PAIN WITHOUT SCIATICA: ICD-10-CM

## 2020-09-09 DIAGNOSIS — Z78.0 ASYMPTOMATIC MENOPAUSAL STATE: ICD-10-CM

## 2020-09-09 DIAGNOSIS — S32.010S COMPRESSION FRACTURE OF L1 VERTEBRA, SEQUELA: ICD-10-CM

## 2020-09-09 RX ORDER — ATORVASTATIN CALCIUM 10 MG/1
TABLET, FILM COATED ORAL
Qty: 90 TABLET | Refills: 3 | Status: SHIPPED | OUTPATIENT
Start: 2020-09-09 | End: 2020-12-02 | Stop reason: SDUPTHER

## 2020-09-09 NOTE — TELEPHONE ENCOUNTER
PT CALLED STATED SHE NEEDED REFILLS SENT IN. I ATTEMPTED TO CALL THE PATIENT BACK BUT HAD TO LEAVE A MESSAGE. I LEFT A MESSAGE STATING WE HAVE ONLY RECEIVED A REFILL REQUEST FOR THE ATORVASTATIN BUT IF SHE IS NEEDING OTHERS TO LET US KNOW.

## 2020-09-10 RX ORDER — LEVOTHYROXINE SODIUM 0.15 MG/1
TABLET ORAL
Qty: 90 TABLET | Refills: 0 | Status: SHIPPED | OUTPATIENT
Start: 2020-09-10 | End: 2020-12-02 | Stop reason: SDUPTHER

## 2020-09-10 NOTE — TELEPHONE ENCOUNTER
----- Message from Meredith Lea Kehrer, MD sent at 9/9/2020  4:46 PM EDT -----  Patient's x-ray of her low back revealed multilevel degenerative disc disease and arthritis.  I would recommend starting with physical therapy.

## 2020-09-14 ENCOUNTER — OFFICE VISIT (OUTPATIENT)
Dept: FAMILY MEDICINE CLINIC | Facility: CLINIC | Age: 85
End: 2020-09-14

## 2020-09-14 VITALS
BODY MASS INDEX: 23.3 KG/M2 | OXYGEN SATURATION: 98 % | HEIGHT: 66 IN | SYSTOLIC BLOOD PRESSURE: 100 MMHG | WEIGHT: 145 LBS | TEMPERATURE: 97.1 F | DIASTOLIC BLOOD PRESSURE: 64 MMHG | HEART RATE: 58 BPM

## 2020-09-14 DIAGNOSIS — M51.36 LUMBAR DEGENERATIVE DISC DISEASE: Primary | ICD-10-CM

## 2020-09-14 DIAGNOSIS — S32.010S COMPRESSION FRACTURE OF L1 VERTEBRA, SEQUELA: ICD-10-CM

## 2020-09-14 DIAGNOSIS — K59.00 CONSTIPATION, UNSPECIFIED CONSTIPATION TYPE: ICD-10-CM

## 2020-09-14 PROCEDURE — 99213 OFFICE O/P EST LOW 20 MIN: CPT | Performed by: FAMILY MEDICINE

## 2020-09-14 NOTE — PROGRESS NOTES
Subjective   Isabell Angel is a 85 y.o. female.     Chief Complaint   Patient presents with   • Results   • Constipation        Has had some really bad constipation for the past few days.  Finally had some relief yesterday.  Stools were dark.   Nonbloody non-tarry however.  She does tend to alternate between constipation and diarrhea not depending on diet.    Patient also wants to discuss her back x-rays.  Since her compression fracture is no worse she is willing to do physical therapy to help with her pain.  She is stressed about packing her house because she is already sold it and is getting ready to move.         The following portions of the patient's history were reviewed and updated as appropriate: allergies, current medications, past family history, past medical history, past social history, past surgical history and problem list.    Past Medical History:   Diagnosis Date   • Abdominal pain    • Abdominal wall strain    • Acid reflux    • Acute sinusitis    • Acute upper respiratory infection    • Allergic rhinitis, seasonal    • Anemia     iron deficiency anemia, resolved   • Aneurysm of ascending aorta (CMS/HCC)    • Anxiety disorder    • Aortic aneurysm (CMS/HCC)     MONITORED   • Arthritis    • Atrial fibrillation (CMS/HCC)    • Cataract    • Chronic periscapular pain on right side    • Chronic renal insufficiency    • Closed compression fracture of L1 vertebra (CMS/HCC)    • Colon distention    • Colon polyp    • Community acquired pneumonia    • Constipation    • Coronary artery disease    • Cough    • Cyst, bone    • Disease of thyroid gland    • DJD of shoulder    • Dyspnea    • Dysuria    • Edema    • Elevated brain natriuretic peptide (BNP) level    • Encounter for long-term (current) use of medications    • Esophageal reflux disease    • Fatigue    • Generalized abdominal pain    • Glaucoma    • Glenohumeral arthritis, right    • H/O gastroesophageal reflux (GERD)    • Hematuria    • History of  iron deficiency anemia    • Hypercalcemia     Due to increased calcium intake in addition to calcium supplements   • Hypercholesterolemia    • Hyperkalemia    • Hyperlipidemia    • Hyperpigmentation of skin of cheek    • Hypertension    • Hyponatremia    • Hypothyroidism    • IgG monoclonal gammopathy     IgG kappa type   • Incisional hernia    • Influenza    • Irregular heart beat     PACEMAKER   • Itching in the vaginal area    • Low vitamin B12 level    • Lumbago    • Memory change    • Monitoring for long-term anticoagulant use    • Nonvenomous insect bite    • Osteoarthritis of left glenohumeral joint    • Osteoarthritis of right glenohumeral joint    • Osteopenia    • Osteoporosis    • Paroxysmal atrial fibrillation (CMS/HCC)    • Periscapular pain    • Persistent atrial fibrillation (CMS/HCC)    • Rash    • Right shoulder pain    • Shortness of breath    • Sick sinus syndrome (CMS/HCC)    • Sinus bradycardia    • Status post placement of cardiac pacemaker    • Tricuspid regurgitation    • Urinary body odor    • Urinary frequency    • Urinary tract infection    • Urinary urgency    • Viral enteritis    • Viral gastroenteritis    • Vitamin D deficiency    • Weakness    • Weakness of distal arms and legs        Past Surgical History:   Procedure Laterality Date   • ABDOMINAL SURGERY  09/18/2012    Pelvic abscess draining with appendectomy and resection and anastomosis of small intestine   • APPENDECTOMY  09/18/2012   • CARPAL TUNNEL RELEASE Bilateral    • CATARACT EXTRACTION     • CHOLECYSTECTOMY  08/31/2012   • COLON RESECTION SMALL BOWEL  09/2012    Removal of 1   foot of small bowel and abscess   • COLONOSCOPY N/A 4/13/2016    Procedure: COLONOSCOPY to cecum;  Surgeon: Pro Paredes MD;  Location: [facility] ENDOSCOPY;  Service:    • HYSTERECTOMY  1984   • INSERT / REPLACE / REMOVE PACEMAKER      insertion   • OOPHORECTOMY     • PACEMAKER IMPLANTATION     • TONSILLECTOMY         Family History    Problem Relation Age of Onset   • Cancer Brother    • Hypertension Mother    • Heart disease Father    • Hypertension Sister    • Breast cancer Sister    • Arthritis Other    • Macular degeneration Other    • Heart disease Other    • Other Other         colitis       Social History     Socioeconomic History   • Marital status:      Spouse name: Not on file   • Number of children: Not on file   • Years of education: Not on file   • Highest education level: Not on file   Occupational History     Employer: RETIRED   Tobacco Use   • Smoking status: Never Smoker   • Smokeless tobacco: Never Used   Substance and Sexual Activity   • Alcohol use: No   • Drug use: No   • Sexual activity: Defer   Social History Narrative    The patient is a . She is a retired . She does not smoke or drink.         Current Outpatient Medications:   •  apixaban (ELIQUIS) 5 MG tablet tablet, Take 5 mg by mouth 2 (Two) Times a Day., Disp: , Rfl:   •  atorvastatin (LIPITOR) 10 MG tablet, TAKE ONE TABLET BY MOUTH DAILY, Disp: 90 tablet, Rfl: 3  •  bumetanide (BUMEX) 1 MG tablet, Take 0.5 mg by mouth Daily., Disp: , Rfl:   •  doxazosin (CARDURA) 2 MG tablet, TAKE ONE TABLET BY MOUTH ONCE NIGHTLY, Disp: 90 tablet, Rfl: 1  •  famotidine (PEPCID) 40 MG tablet, Take 1 tablet by mouth 2 (Two) Times a Day., Disp: 180 tablet, Rfl: 2  •  fluticasone (FLONASE) 50 MCG/ACT nasal spray, PLACE 2 SPRAYS IN EACH NOSTRIL DAILY, Disp: 48 g, Rfl: 2  •  irbesartan (AVAPRO) 300 MG tablet, TAKE ONE TABLET BY MOUTH DAILY, Disp: 90 tablet, Rfl: 0  •  levothyroxine (SYNTHROID, LEVOTHROID) 150 MCG tablet, TAKE 1 TABLET BY MOUTH DAILY IN THE MORNING ON A EMPTY STOMACH., Disp: 90 tablet, Rfl: 0  •  metoprolol succinate XL (TOPROL-XL) 100 MG 24 hr tablet, TAKE 1 AND 1/2 TABLET BY MOUTH DAILY, Disp: 135 tablet, Rfl: 3  •  timolol (TIMOPTIC) 0.5 % ophthalmic solution, Administer 1 drop to both eyes Daily., Disp: , Rfl:   •  vitamin B-12 (CYANOCOBALAMIN)  "1000 MCG tablet, Take 1 tablet by mouth Daily., Disp: , Rfl:     Review of Systems   Constitutional: Negative for chills, fatigue and fever.   HENT: Negative for congestion, rhinorrhea and sore throat.    Respiratory: Negative for cough and shortness of breath.    Cardiovascular: Negative for chest pain and leg swelling.   Gastrointestinal: Negative for abdominal pain.   Endocrine: Negative for polydipsia and polyuria.   Genitourinary: Negative for dysuria.   Musculoskeletal: Positive for arthralgias and back pain. Negative for myalgias.   Skin: Negative for rash.   Neurological: Negative for dizziness.   Hematological: Does not bruise/bleed easily.   Psychiatric/Behavioral: Negative for sleep disturbance.       Objective   Vitals:    09/14/20 1458   BP: 100/64   Pulse: 58   Temp: 97.1 °F (36.2 °C)   SpO2: 98%   Weight: 65.8 kg (145 lb)   Height: 167 cm (65.75\")     Body mass index is 23.58 kg/m².  Physical Exam      Assessment/Plan   Isabell was seen today for results and constipation.    Diagnoses and all orders for this visit:    Lumbar degenerative disc disease  -     Ambulatory Referral to Physical Therapy Evaluate and treat    Compression fracture of L1 vertebra, sequela  -     Ambulatory Referral to Physical Therapy Evaluate and treat    Constipation, unspecified constipation type               Patient Instructions   Try starting daily fiber such as benefiber.  Next add in stool softener as needed.    Let me know if back does not get better with physical therapy.        "

## 2020-09-14 NOTE — PATIENT INSTRUCTIONS
Try starting daily fiber such as benefiber.  Next add in stool softener as needed.    Let me know if back does not get better with physical therapy.

## 2020-10-12 ENCOUNTER — TELEPHONE (OUTPATIENT)
Dept: ONCOLOGY | Facility: CLINIC | Age: 85
End: 2020-10-12

## 2020-10-12 NOTE — TELEPHONE ENCOUNTER
----- Message from Ariela Sawyer sent at 10/12/2020  8:51 AM EDT -----  Regarding: No show letter was sent patient missed labs on 10/8 has appt here on 10/15

## 2020-10-14 ENCOUNTER — TELEPHONE (OUTPATIENT)
Dept: ONCOLOGY | Facility: CLINIC | Age: 85
End: 2020-10-14

## 2020-10-14 NOTE — TELEPHONE ENCOUNTER
PATIENT CALLING, SHE ACCIDENTALLY MISSED HER LAB APPT FOR 10-8-20 AND NEEDS TO RESCHEDULE THAT AND HER APPT FOR TOMORROW 10-15-20, SHE IS OPEN NEXT WEEK BUT THE NEXT AVAILABLE IS NOT UNTIL 10-29-20, PLEASE ADVISE?    PT CALL BACK # [phone]

## 2020-10-15 ENCOUNTER — APPOINTMENT (OUTPATIENT)
Dept: LAB | Facility: HOSPITAL | Age: 85
End: 2020-10-15

## 2020-10-23 ENCOUNTER — TELEPHONE (OUTPATIENT)
Dept: ONCOLOGY | Facility: CLINIC | Age: 85
End: 2020-10-23

## 2020-10-23 NOTE — TELEPHONE ENCOUNTER
----- Message from Ariela Sawyer sent at 10/23/2020  1:05 PM EDT -----  Regarding: missed labs on 10/21 appt Dr Santana is  10/29

## 2020-11-06 ENCOUNTER — DOCUMENTATION (OUTPATIENT)
Dept: ONCOLOGY | Facility: CLINIC | Age: 85
End: 2020-11-06

## 2020-11-06 ENCOUNTER — TELEPHONE (OUTPATIENT)
Dept: ONCOLOGY | Facility: CLINIC | Age: 85
End: 2020-11-06

## 2020-11-06 NOTE — TELEPHONE ENCOUNTER
Patient called she would like a call back from the nurse regarding a Lab order, she missed her last appt.   Best call back number [phone]

## 2020-11-06 NOTE — TELEPHONE ENCOUNTER
PT'S DGT IN LAW ASKING FOR PT'S LAB ORDERS TO BE FAXED TO DR. BRIZUELA @ #[phone]. PT HAS MOVED TO [place] AND WILL GET LABS DONE THERE. MESSAGED ONC RN'S TO HANDLE. DGT IN LAW V/U.

## 2020-12-02 ENCOUNTER — TELEPHONE (OUTPATIENT)
Dept: FAMILY MEDICINE CLINIC | Facility: CLINIC | Age: 85
End: 2020-12-02

## 2020-12-02 RX ORDER — ATORVASTATIN CALCIUM 10 MG/1
10 TABLET, FILM COATED ORAL DAILY
Qty: 90 TABLET | Refills: 0 | Status: SHIPPED | OUTPATIENT
Start: 2020-12-02 | End: 2021-02-22

## 2020-12-02 RX ORDER — FAMOTIDINE 40 MG/1
40 TABLET, FILM COATED ORAL 2 TIMES DAILY
Qty: 180 TABLET | Refills: 0 | Status: SHIPPED | OUTPATIENT
Start: 2020-12-02 | End: 2021-04-24

## 2020-12-02 RX ORDER — METOPROLOL SUCCINATE 100 MG/1
150 TABLET, EXTENDED RELEASE ORAL DAILY
Qty: 135 TABLET | Refills: 0 | Status: SHIPPED | OUTPATIENT
Start: 2020-12-02 | End: 2021-03-05

## 2020-12-02 RX ORDER — DOXAZOSIN 2 MG/1
2 TABLET ORAL NIGHTLY
Qty: 90 TABLET | Refills: 0 | Status: SHIPPED | OUTPATIENT
Start: 2020-12-02 | End: 2021-03-05

## 2020-12-02 RX ORDER — IRBESARTAN 300 MG/1
300 TABLET ORAL DAILY
Qty: 90 TABLET | Refills: 0 | Status: SHIPPED | OUTPATIENT
Start: 2020-12-02 | End: 2021-02-25

## 2020-12-02 RX ORDER — LEVOTHYROXINE SODIUM 0.15 MG/1
TABLET ORAL
Qty: 90 TABLET | Refills: 0 | Status: SHIPPED | OUTPATIENT
Start: 2020-12-02

## 2020-12-02 NOTE — TELEPHONE ENCOUNTER
Caller: Isabell Angel    Relationship: Self    Best call back number: [phone]    Medication needed:   Requested Prescriptions     Pending Prescriptions Disp Refills   • metoprolol succinate XL (TOPROL-XL) 100 MG 24 hr tablet 135 tablet 3     Sig: Take 1.5 tablets by mouth Daily.   • irbesartan (AVAPRO) 300 MG tablet 90 tablet 0     Sig: Take 1 tablet by mouth Daily.   • doxazosin (CARDURA) 2 MG tablet 90 tablet 1     Sig: Take 1 tablet by mouth Every Night.   • famotidine (PEPCID) 40 MG tablet 180 tablet 2     Sig: Take 1 tablet by mouth 2 (Two) Times a Day.   • atorvastatin (LIPITOR) 10 MG tablet 90 tablet 3     Sig: Take 1 tablet by mouth Daily.   • levothyroxine (SYNTHROID, LEVOTHROID) 150 MCG tablet 90 tablet 0     Sig: TAKE 1 TABLET BY MOUTH DAILY IN THE MORNING ON A EMPTY STOMACH.       When do you need the refill by: ASAP    What details did the patient provide when requesting the medication: PATIENT HAS MOVED AND IS REQUESTING HER PRESCRIPTIONS TO BE SENT TO THE [facility]. PATIENT STATES SHE HAS TO WAIT 2 MONTHS TO SEE A NEW PCP     Does the patient have less than a 3 day supply:  [] Yes  [x] No    What is the patient's preferred pharmacy: [id number] [facility] [place] - [address] AT [address] OF [address]  [phone] [facility] [phone] FX

## 2021-02-22 RX ORDER — ATORVASTATIN CALCIUM 10 MG/1
10 TABLET, FILM COATED ORAL DAILY
Qty: 90 TABLET | Refills: 0 | Status: SHIPPED | OUTPATIENT
Start: 2021-02-22 | End: 2021-05-04

## 2021-02-25 RX ORDER — IRBESARTAN 300 MG/1
300 TABLET ORAL DAILY
Qty: 90 TABLET | Refills: 0 | Status: SHIPPED | OUTPATIENT
Start: 2021-02-25

## 2021-03-05 RX ORDER — METOPROLOL SUCCINATE 100 MG/1
TABLET, EXTENDED RELEASE ORAL
Qty: 135 TABLET | Refills: 0 | Status: SHIPPED | OUTPATIENT
Start: 2021-03-05

## 2021-03-05 RX ORDER — DOXAZOSIN 2 MG/1
2 TABLET ORAL NIGHTLY
Qty: 90 TABLET | Refills: 0 | Status: SHIPPED | OUTPATIENT
Start: 2021-03-05

## 2021-04-24 RX ORDER — FAMOTIDINE 40 MG/1
TABLET, FILM COATED ORAL
Qty: 180 TABLET | Refills: 3 | Status: SHIPPED | OUTPATIENT
Start: 2021-04-24 | End: 2022-05-12

## 2021-05-04 RX ORDER — ATORVASTATIN CALCIUM 10 MG/1
10 TABLET, FILM COATED ORAL DAILY
Qty: 90 TABLET | Refills: 0 | Status: SHIPPED | OUTPATIENT
Start: 2021-05-04 | End: 2021-08-09

## 2021-06-02 RX ORDER — IRBESARTAN 300 MG/1
300 TABLET ORAL DAILY
Qty: 90 TABLET | Refills: 0 | OUTPATIENT
Start: 2021-06-02

## 2021-06-02 RX ORDER — METOPROLOL SUCCINATE 100 MG/1
TABLET, EXTENDED RELEASE ORAL
Qty: 135 TABLET | Refills: 0 | OUTPATIENT
Start: 2021-06-02

## 2021-08-09 RX ORDER — ATORVASTATIN CALCIUM 10 MG/1
10 TABLET, FILM COATED ORAL DAILY
Qty: 90 TABLET | Refills: 0 | Status: SHIPPED | OUTPATIENT
Start: 2021-08-09

## 2022-05-12 RX ORDER — FAMOTIDINE 40 MG/1
TABLET, FILM COATED ORAL
Qty: 180 TABLET | Refills: 3 | Status: SHIPPED | OUTPATIENT
Start: 2022-05-12

## 2023-04-18 RX ORDER — FAMOTIDINE 40 MG/1
TABLET, FILM COATED ORAL
Qty: 180 TABLET | Refills: 3 | OUTPATIENT
Start: 2023-04-18

## 2023-06-02 RX ORDER — FAMOTIDINE 40 MG/1
TABLET, FILM COATED ORAL
Qty: 180 TABLET | Refills: 3 | OUTPATIENT
Start: 2023-06-02